# Patient Record
Sex: MALE | Race: WHITE | NOT HISPANIC OR LATINO | URBAN - METROPOLITAN AREA
[De-identification: names, ages, dates, MRNs, and addresses within clinical notes are randomized per-mention and may not be internally consistent; named-entity substitution may affect disease eponyms.]

---

## 2018-07-11 ENCOUNTER — OUTPATIENT (OUTPATIENT)
Dept: OUTPATIENT SERVICES | Facility: HOSPITAL | Age: 83
LOS: 1 days | Discharge: HOME | End: 2018-07-11
Payer: MEDICARE

## 2018-07-11 ENCOUNTER — EMERGENCY (EMERGENCY)
Facility: HOSPITAL | Age: 83
LOS: 0 days | Discharge: HOME | End: 2018-07-11
Attending: EMERGENCY MEDICINE
Payer: MEDICARE

## 2018-07-11 VITALS
RESPIRATION RATE: 18 BRPM | DIASTOLIC BLOOD PRESSURE: 91 MMHG | TEMPERATURE: 98 F | HEART RATE: 67 BPM | HEIGHT: 70 IN | SYSTOLIC BLOOD PRESSURE: 162 MMHG | WEIGHT: 184.97 LBS | OXYGEN SATURATION: 97 %

## 2018-07-11 VITALS
HEART RATE: 63 BPM | RESPIRATION RATE: 18 BRPM | SYSTOLIC BLOOD PRESSURE: 171 MMHG | HEIGHT: 70 IN | TEMPERATURE: 98 F | DIASTOLIC BLOOD PRESSURE: 73 MMHG | WEIGHT: 189.38 LBS | OXYGEN SATURATION: 99 %

## 2018-07-11 VITALS
SYSTOLIC BLOOD PRESSURE: 196 MMHG | OXYGEN SATURATION: 99 % | HEART RATE: 63 BPM | RESPIRATION RATE: 18 BRPM | TEMPERATURE: 98 F | DIASTOLIC BLOOD PRESSURE: 88 MMHG | HEIGHT: 70 IN | WEIGHT: 189.38 LBS

## 2018-07-11 DIAGNOSIS — M16.9 OSTEOARTHRITIS OF HIP, UNSPECIFIED: ICD-10-CM

## 2018-07-11 DIAGNOSIS — R03.0 ELEVATED BLOOD-PRESSURE READING, WITHOUT DIAGNOSIS OF HYPERTENSION: ICD-10-CM

## 2018-07-11 DIAGNOSIS — Z01.818 ENCOUNTER FOR OTHER PREPROCEDURAL EXAMINATION: ICD-10-CM

## 2018-07-11 DIAGNOSIS — I10 ESSENTIAL (PRIMARY) HYPERTENSION: ICD-10-CM

## 2018-07-11 DIAGNOSIS — Z90.89 ACQUIRED ABSENCE OF OTHER ORGANS: Chronic | ICD-10-CM

## 2018-07-11 DIAGNOSIS — Z90.49 ACQUIRED ABSENCE OF OTHER SPECIFIED PARTS OF DIGESTIVE TRACT: Chronic | ICD-10-CM

## 2018-07-11 DIAGNOSIS — M17.12 UNILATERAL PRIMARY OSTEOARTHRITIS, LEFT KNEE: ICD-10-CM

## 2018-07-11 DIAGNOSIS — H91.90 UNSPECIFIED HEARING LOSS, UNSPECIFIED EAR: ICD-10-CM

## 2018-07-11 LAB
ANION GAP SERPL CALC-SCNC: 5 MMOL/L — SIGNIFICANT CHANGE UP (ref 5–17)
APTT BLD: 36.1 SEC — SIGNIFICANT CHANGE UP (ref 27.5–37.4)
BASOPHILS # BLD AUTO: 0.01 K/UL — SIGNIFICANT CHANGE UP (ref 0–0.2)
BASOPHILS NFR BLD AUTO: 0.2 % — SIGNIFICANT CHANGE UP (ref 0–2)
BUN SERPL-MCNC: 27 MG/DL — HIGH (ref 7–23)
CALCIUM SERPL-MCNC: 9 MG/DL — SIGNIFICANT CHANGE UP (ref 8.5–10.1)
CHLORIDE SERPL-SCNC: 107 MMOL/L — SIGNIFICANT CHANGE UP (ref 96–108)
CO2 SERPL-SCNC: 29 MMOL/L — SIGNIFICANT CHANGE UP (ref 22–31)
CREAT SERPL-MCNC: 0.86 MG/DL — SIGNIFICANT CHANGE UP (ref 0.5–1.3)
EOSINOPHIL # BLD AUTO: 0.15 K/UL — SIGNIFICANT CHANGE UP (ref 0–0.5)
EOSINOPHIL NFR BLD AUTO: 2.7 % — SIGNIFICANT CHANGE UP (ref 0–6)
GLUCOSE SERPL-MCNC: 95 MG/DL — SIGNIFICANT CHANGE UP (ref 70–99)
HBA1C BLD-MCNC: 5.4 % — SIGNIFICANT CHANGE UP (ref 4–5.6)
HCT VFR BLD CALC: 47.9 % — SIGNIFICANT CHANGE UP (ref 39–50)
HGB BLD-MCNC: 15.8 G/DL — SIGNIFICANT CHANGE UP (ref 13–17)
IMM GRANULOCYTES NFR BLD AUTO: 0.2 % — SIGNIFICANT CHANGE UP (ref 0–1.5)
INR BLD: 1.01 RATIO — SIGNIFICANT CHANGE UP (ref 0.88–1.16)
LYMPHOCYTES # BLD AUTO: 1.12 K/UL — SIGNIFICANT CHANGE UP (ref 1–3.3)
LYMPHOCYTES # BLD AUTO: 20.5 % — SIGNIFICANT CHANGE UP (ref 13–44)
MCHC RBC-ENTMCNC: 29.5 PG — SIGNIFICANT CHANGE UP (ref 27–34)
MCHC RBC-ENTMCNC: 33 GM/DL — SIGNIFICANT CHANGE UP (ref 32–36)
MCV RBC AUTO: 89.4 FL — SIGNIFICANT CHANGE UP (ref 80–100)
MONOCYTES # BLD AUTO: 0.37 K/UL — SIGNIFICANT CHANGE UP (ref 0–0.9)
MONOCYTES NFR BLD AUTO: 6.8 % — SIGNIFICANT CHANGE UP (ref 2–14)
MRSA PCR RESULT.: SIGNIFICANT CHANGE UP
NEUTROPHILS # BLD AUTO: 3.81 K/UL — SIGNIFICANT CHANGE UP (ref 1.8–7.4)
NEUTROPHILS NFR BLD AUTO: 69.6 % — SIGNIFICANT CHANGE UP (ref 43–77)
PLATELET # BLD AUTO: 176 K/UL — SIGNIFICANT CHANGE UP (ref 150–400)
POTASSIUM SERPL-MCNC: 5 MMOL/L — SIGNIFICANT CHANGE UP (ref 3.5–5.3)
POTASSIUM SERPL-SCNC: 5 MMOL/L — SIGNIFICANT CHANGE UP (ref 3.5–5.3)
PROTHROM AB SERPL-ACNC: 11 SEC — SIGNIFICANT CHANGE UP (ref 9.8–12.7)
RBC # BLD: 5.36 M/UL — SIGNIFICANT CHANGE UP (ref 4.2–5.8)
RBC # FLD: 12.8 % — SIGNIFICANT CHANGE UP (ref 10.3–14.5)
S AUREUS DNA NOSE QL NAA+PROBE: DETECTED
SODIUM SERPL-SCNC: 141 MMOL/L — SIGNIFICANT CHANGE UP (ref 135–145)
WBC # BLD: 5.47 K/UL — SIGNIFICANT CHANGE UP (ref 3.8–10.5)
WBC # FLD AUTO: 5.47 K/UL — SIGNIFICANT CHANGE UP (ref 3.8–10.5)

## 2018-07-11 PROCEDURE — 99282 EMERGENCY DEPT VISIT SF MDM: CPT

## 2018-07-11 PROCEDURE — 93010 ELECTROCARDIOGRAM REPORT: CPT | Mod: NC

## 2018-07-11 NOTE — H&P PST ADULT - PROBLEM SELECTOR PLAN 1
Left Hip Arthroplasty scheduled for 7/26/2018.  Pre-op instructions given by RN  Chlorhexidine wash instructions given  Pending: Medical clearance

## 2018-07-11 NOTE — ED PROVIDER NOTE - MEDICAL DECISION MAKING DETAILS
82 yo M with hypertension, no complaints currently  -f/u with pcp, start bp medications as needed outpt.  -pt. understands to return if

## 2018-07-11 NOTE — PHYSICAL THERAPY INITIAL EVALUATION ADULT - PERTINENT HX OF CURRENT PROBLEM, REHAB EVAL
Patient attends Pre-Op Testing today following consult with Dr. Yu  due to chronic pain to left hip  with findings of     on arthritic damage. No significant past medical/surgical histories. Elective THR is now scheduled in this facility for 7/26/18. Patient attends Pre-Op Testing today following consult with Dr. Yu  due to chronic pain to left hip  with findings of arthritic damage. No significant past medical/surgical histories. Elective THR is now scheduled in this facility for 7/26/18.

## 2018-07-11 NOTE — H&P PST ADULT - NEGATIVE MUSCULOSKELETAL SYMPTOMS
no arthralgia/no joint swelling/no myalgia/no stiffness/no neck pain/no muscle cramps/no muscle weakness no arthralgia/no neck pain/no back pain/no leg pain R/no joint swelling/no stiffness/no arm pain R/no muscle weakness/no myalgia/no muscle cramps/no arm pain L

## 2018-07-11 NOTE — PHYSICAL THERAPY INITIAL EVALUATION ADULT - CRITERIA FOR SKILLED THERAPEUTIC INTERVENTIONS
impairments found/anticipated equipment needs at discharge/functional limitations in following categories/:CI,:CH,:CI/rehab potential/therapy frequency/anticipated discharge recommendation/risk reduction/prevention

## 2018-07-11 NOTE — PHYSICAL THERAPY INITIAL EVALUATION ADULT - ADDITIONAL COMMENTS
Per patient, pain is worse when increased walking and standing. Pain is relieved sitting, resting, and taking pain medication. Patient lives with wife in private house with 3 steps to enter with pole to hold on. Has 13 steps bedroom with handrails. Half-bath at main level; has no rooms there to stay at temporarily. Bathroom facility at 2nd floor is a shower stall without adaptive devices. Patient will be supported by wife post-op at home. Patient uses a quadcane at baseline, and owns a 3:1 commode from wife's knee surgery; is independent for ADLs and gait. Patient is retired; is  left-handed and drives. Patient denies falls in past 6 months.

## 2018-07-11 NOTE — PATIENT PROFILE ADULT. - ABILITY TO HEAR (WITH HEARING AID OR HEARING APPLIANCE IF NORMALLY USED):
Mildly to Moderately Impaired: difficulty hearing in some environments or speaker may need to increase volume or speak distinctly/[place]

## 2018-07-11 NOTE — PHYSICAL THERAPY INITIAL EVALUATION ADULT - MODIFIED CLINICAL TEST OF SENSORY INTEGRATION IN BALANCE TEST
5x Sit to Stand Test = 12 seconds, indicating significant impairment c functional mobility & strength  ; 2 Minute Walk Test = 328 feet with quadcane, no rest stops, measured for baseline recording

## 2018-07-11 NOTE — PHYSICAL THERAPY INITIAL EVALUATION ADULT - IMPAIRED TRANSFERS: SIT/STAND, REHAB EVAL
decreased ROM/impaired balance/decreased strength/narrow base of support/pain/impaired postural control

## 2018-07-11 NOTE — OCCUPATIONAL THERAPY INITIAL EVALUATION ADULT - FINE MOTOR COORDINATION, FINE MOTOR COORDINATION TESTS, OT EVAL
Patient-specific activity scoring scheme (Point to one number): 0 -------5-------- 10 (0) =Unable to perform activity (10) -- Able to perform activity at the same level as before injury or problem. Activity: Standing__5____, Activity: stairs____7_______

## 2018-07-11 NOTE — H&P PST ADULT - HISTORY OF PRESENT ILLNESS
84yo male denies significant medical history, reports pain to left hip x 1 year, with progressive worsening of pain and difficulty walking. Pt presents today for Left Hip Arthroplasty scheduled for 7/26/2018.

## 2018-07-11 NOTE — ED ADULT TRIAGE NOTE - CHIEF COMPLAINT QUOTE
patient was send here from presurgical testing for evaluation high BP , patient denied chest pain , no headache denied N/V denied dizziness

## 2018-07-11 NOTE — H&P PST ADULT - PROBLEM SELECTOR PLAN 2
Medical clearance requested Medical clearance requested  Pt sent to ER@ 145pm accompanied by wife, pt's PCP not in office today  Pt asymptomatic /98, 204/84, 196/88

## 2018-07-11 NOTE — H&P PST ADULT - MUSCULOSKELETAL
details… detailed exam no joint erythema/no joint swelling/no calf tenderness/decreased ROM due to pain/diminished strength/no joint warmth

## 2018-07-11 NOTE — OCCUPATIONAL THERAPY INITIAL EVALUATION ADULT - ADDITIONAL COMMENTS
Patient lives in a private home with 3 steps to enter wit a pole to hold on to. Once inside, the patient has 13 steps with a railing on one side to reach main floor where bedroom and bathroom is. The patients bathroom has a shower stall with a regular toilet and owns a commode. The patient ambulates with a quad cane and does not own any other devices for ambulation. The patient is L handed, wears glasses for reading and drives.

## 2018-07-11 NOTE — PHYSICAL THERAPY INITIAL EVALUATION ADULT - IMPAIRMENTS FOUND, PT EVAL
neuromotor development and sensory integration/gait, locomotion, and balance/aerobic capacity/endurance/arousal, attention, and cognition/ROM/ergonomics and body mechanics/joint integrity and mobility/muscle strength/posture

## 2018-07-11 NOTE — ED PROVIDER NOTE - OBJECTIVE STATEMENT
84 yo M with htn, sent from presurgical testing.  Pt. has no complaints.  His pressure was 200/100 at the time.  Pt. admits he was stressed about the testing and getting his hip replacement.  No complaints currently.  Pressure is significantly better now at triage.   ROS: negative for fever, cough, headache, chest pain, shortness of breath, abd pain, nausea, vomiting, diarrhea, rash, paresthesia, and weakness--all other systems reviewed are negative.   PMH: negative; Meds: Denies; SH: Denies smoking/drinking/drug use

## 2018-07-15 ENCOUNTER — EMERGENCY (EMERGENCY)
Facility: HOSPITAL | Age: 83
LOS: 0 days | Discharge: HOME | End: 2018-07-15
Attending: EMERGENCY MEDICINE
Payer: MEDICARE

## 2018-07-15 VITALS
WEIGHT: 184.97 LBS | DIASTOLIC BLOOD PRESSURE: 85 MMHG | OXYGEN SATURATION: 99 % | HEIGHT: 70 IN | SYSTOLIC BLOOD PRESSURE: 175 MMHG | TEMPERATURE: 98 F | HEART RATE: 66 BPM | RESPIRATION RATE: 18 BRPM

## 2018-07-15 VITALS
TEMPERATURE: 98 F | HEART RATE: 59 BPM | SYSTOLIC BLOOD PRESSURE: 144 MMHG | OXYGEN SATURATION: 98 % | RESPIRATION RATE: 18 BRPM | DIASTOLIC BLOOD PRESSURE: 75 MMHG

## 2018-07-15 DIAGNOSIS — Z79.1 LONG TERM (CURRENT) USE OF NON-STEROIDAL ANTI-INFLAMMATORIES (NSAID): ICD-10-CM

## 2018-07-15 DIAGNOSIS — M17.0 BILATERAL PRIMARY OSTEOARTHRITIS OF KNEE: ICD-10-CM

## 2018-07-15 DIAGNOSIS — M25.461 EFFUSION, RIGHT KNEE: ICD-10-CM

## 2018-07-15 DIAGNOSIS — Z90.49 ACQUIRED ABSENCE OF OTHER SPECIFIED PARTS OF DIGESTIVE TRACT: Chronic | ICD-10-CM

## 2018-07-15 DIAGNOSIS — M16.12 UNILATERAL PRIMARY OSTEOARTHRITIS, LEFT HIP: ICD-10-CM

## 2018-07-15 DIAGNOSIS — Z90.89 ACQUIRED ABSENCE OF OTHER ORGANS: Chronic | ICD-10-CM

## 2018-07-15 DIAGNOSIS — M25.561 PAIN IN RIGHT KNEE: ICD-10-CM

## 2018-07-15 PROCEDURE — 73564 X-RAY EXAM KNEE 4 OR MORE: CPT | Mod: 26,RT

## 2018-07-15 PROCEDURE — 99284 EMERGENCY DEPT VISIT MOD MDM: CPT

## 2018-07-15 RX ORDER — KETOROLAC TROMETHAMINE 30 MG/ML
15 SYRINGE (ML) INJECTION ONCE
Qty: 0 | Refills: 0 | Status: DISCONTINUED | OUTPATIENT
Start: 2018-07-15 | End: 2018-07-15

## 2018-07-15 RX ADMIN — Medication 15 MILLIGRAM(S): at 11:07

## 2018-07-15 NOTE — ED PROVIDER NOTE - OBJECTIVE STATEMENT
83 year old male with PMH of hearing impairment, PSH of tosillectomy, appendectomy presenting to ED due to R knee pain - pt has hx of left knee and hip arthritis - now scheduled for left hip surgery in 2 weeks - pt states for past 4 days noted R knee mildly swollen with pain to joint. Denies any injury or trauma.

## 2018-07-15 NOTE — ED ADULT NURSE NOTE - NS ED NURSE LEVEL OF CONSCIOUSNESS MENTAL STATUS
Problem: Patient Care Overview  Goal: Discharge Needs Assessment  Outcome: Ongoing (interventions implemented as appropriate)         Alert/Cooperative/Awake

## 2018-07-15 NOTE — ED ADULT TRIAGE NOTE - CHIEF COMPLAINT QUOTE
Reports having right knee pain starting a 4 days ago, worsens with movement, and lessens with not bearing weight, and resting. Denies numbness, tingling.

## 2018-07-15 NOTE — ED PROVIDER NOTE - MUSCULOSKELETAL MINIMAL EXAM
R knee mild swelling noted, ROM of joint intact, no erythema of overlying skin. warmth of joint vs left

## 2018-07-15 NOTE — ED ADULT NURSE NOTE - OBJECTIVE STATEMENT
Reports having Right knee pain starting 4 days ago, worsens with movement and bearing weight, lessens with rest. Denies numbness tingling, injury, trauma.

## 2018-07-15 NOTE — ED PROVIDER NOTE - MEDICAL DECISION MAKING DETAILS
R knee with xray noted arthritic changes, otherwise ROM intact, will dc with orthopedic follow up with Dr. Yu

## 2018-07-25 ENCOUNTER — TRANSCRIPTION ENCOUNTER (OUTPATIENT)
Age: 83
End: 2018-07-25

## 2018-07-25 RX ORDER — SODIUM CHLORIDE 9 MG/ML
3 INJECTION INTRAMUSCULAR; INTRAVENOUS; SUBCUTANEOUS EVERY 8 HOURS
Qty: 0 | Refills: 0 | Status: DISCONTINUED | OUTPATIENT
Start: 2018-07-26 | End: 2018-07-27

## 2018-07-26 ENCOUNTER — TRANSCRIPTION ENCOUNTER (OUTPATIENT)
Age: 83
End: 2018-07-26

## 2018-07-26 ENCOUNTER — INPATIENT (INPATIENT)
Facility: HOSPITAL | Age: 83
LOS: 0 days | Discharge: HOME HEALTH | End: 2018-07-27
Attending: ORTHOPAEDIC SURGERY | Admitting: ORTHOPAEDIC SURGERY
Payer: MEDICARE

## 2018-07-26 ENCOUNTER — RESULT REVIEW (OUTPATIENT)
Age: 83
End: 2018-07-26

## 2018-07-26 VITALS
TEMPERATURE: 98 F | DIASTOLIC BLOOD PRESSURE: 75 MMHG | WEIGHT: 187.39 LBS | RESPIRATION RATE: 18 BRPM | HEART RATE: 70 BPM | OXYGEN SATURATION: 98 % | SYSTOLIC BLOOD PRESSURE: 173 MMHG | HEIGHT: 70 IN

## 2018-07-26 DIAGNOSIS — Z90.89 ACQUIRED ABSENCE OF OTHER ORGANS: Chronic | ICD-10-CM

## 2018-07-26 DIAGNOSIS — H91.90 UNSPECIFIED HEARING LOSS, UNSPECIFIED EAR: ICD-10-CM

## 2018-07-26 DIAGNOSIS — M16.12 UNILATERAL PRIMARY OSTEOARTHRITIS, LEFT HIP: ICD-10-CM

## 2018-07-26 DIAGNOSIS — Z90.49 ACQUIRED ABSENCE OF OTHER SPECIFIED PARTS OF DIGESTIVE TRACT: Chronic | ICD-10-CM

## 2018-07-26 LAB
ANION GAP SERPL CALC-SCNC: 9 MMOL/L — SIGNIFICANT CHANGE UP (ref 5–17)
BUN SERPL-MCNC: 20 MG/DL — SIGNIFICANT CHANGE UP (ref 7–23)
CALCIUM SERPL-MCNC: 8.5 MG/DL — SIGNIFICANT CHANGE UP (ref 8.5–10.1)
CHLORIDE SERPL-SCNC: 106 MMOL/L — SIGNIFICANT CHANGE UP (ref 96–108)
CO2 SERPL-SCNC: 26 MMOL/L — SIGNIFICANT CHANGE UP (ref 22–31)
CREAT SERPL-MCNC: 0.89 MG/DL — SIGNIFICANT CHANGE UP (ref 0.5–1.3)
GLUCOSE SERPL-MCNC: 112 MG/DL — HIGH (ref 70–99)
HCT VFR BLD CALC: 43.8 % — SIGNIFICANT CHANGE UP (ref 39–50)
HGB BLD-MCNC: 14.3 G/DL — SIGNIFICANT CHANGE UP (ref 13–17)
MCHC RBC-ENTMCNC: 29.2 PG — SIGNIFICANT CHANGE UP (ref 27–34)
MCHC RBC-ENTMCNC: 32.6 GM/DL — SIGNIFICANT CHANGE UP (ref 32–36)
MCV RBC AUTO: 89.4 FL — SIGNIFICANT CHANGE UP (ref 80–100)
NRBC # BLD: 0 /100 WBCS — SIGNIFICANT CHANGE UP (ref 0–0)
PLATELET # BLD AUTO: 221 K/UL — SIGNIFICANT CHANGE UP (ref 150–400)
POTASSIUM SERPL-MCNC: 3.8 MMOL/L — SIGNIFICANT CHANGE UP (ref 3.5–5.3)
POTASSIUM SERPL-SCNC: 3.8 MMOL/L — SIGNIFICANT CHANGE UP (ref 3.5–5.3)
RBC # BLD: 4.9 M/UL — SIGNIFICANT CHANGE UP (ref 4.2–5.8)
RBC # FLD: 13.1 % — SIGNIFICANT CHANGE UP (ref 10.3–14.5)
SODIUM SERPL-SCNC: 141 MMOL/L — SIGNIFICANT CHANGE UP (ref 135–145)
WBC # BLD: 14.02 K/UL — HIGH (ref 3.8–10.5)
WBC # FLD AUTO: 14.02 K/UL — HIGH (ref 3.8–10.5)

## 2018-07-26 PROCEDURE — 88305 TISSUE EXAM BY PATHOLOGIST: CPT | Mod: 26

## 2018-07-26 PROCEDURE — 99222 1ST HOSP IP/OBS MODERATE 55: CPT

## 2018-07-26 PROCEDURE — 88311 DECALCIFY TISSUE: CPT | Mod: 26

## 2018-07-26 RX ORDER — ASCORBIC ACID 60 MG
500 TABLET,CHEWABLE ORAL
Qty: 0 | Refills: 0 | Status: DISCONTINUED | OUTPATIENT
Start: 2018-07-26 | End: 2018-07-27

## 2018-07-26 RX ORDER — ASPIRIN/CALCIUM CARB/MAGNESIUM 324 MG
325 TABLET ORAL
Qty: 0 | Refills: 0 | Status: DISCONTINUED | OUTPATIENT
Start: 2018-07-27 | End: 2018-07-27

## 2018-07-26 RX ORDER — DEXAMETHASONE 0.5 MG/5ML
10 ELIXIR ORAL ONCE
Qty: 0 | Refills: 0 | Status: COMPLETED | OUTPATIENT
Start: 2018-07-27 | End: 2018-07-27

## 2018-07-26 RX ORDER — PANTOPRAZOLE SODIUM 20 MG/1
40 TABLET, DELAYED RELEASE ORAL DAILY
Qty: 0 | Refills: 0 | Status: DISCONTINUED | OUTPATIENT
Start: 2018-07-26 | End: 2018-07-27

## 2018-07-26 RX ORDER — OXYCODONE HYDROCHLORIDE 5 MG/1
5 TABLET ORAL EVERY 4 HOURS
Qty: 0 | Refills: 0 | Status: COMPLETED | OUTPATIENT
Start: 2018-07-26 | End: 2018-07-27

## 2018-07-26 RX ORDER — HYDROMORPHONE HYDROCHLORIDE 2 MG/ML
1 INJECTION INTRAMUSCULAR; INTRAVENOUS; SUBCUTANEOUS EVERY 4 HOURS
Qty: 0 | Refills: 0 | Status: DISCONTINUED | OUTPATIENT
Start: 2018-07-26 | End: 2018-07-27

## 2018-07-26 RX ORDER — ACETAMINOPHEN 500 MG
650 TABLET ORAL EVERY 6 HOURS
Qty: 0 | Refills: 0 | Status: DISCONTINUED | OUTPATIENT
Start: 2018-07-26 | End: 2018-07-26

## 2018-07-26 RX ORDER — DOCUSATE SODIUM 100 MG
100 CAPSULE ORAL THREE TIMES A DAY
Qty: 0 | Refills: 0 | Status: DISCONTINUED | OUTPATIENT
Start: 2018-07-26 | End: 2018-07-27

## 2018-07-26 RX ORDER — ACETAMINOPHEN 500 MG
1000 TABLET ORAL ONCE
Qty: 0 | Refills: 0 | Status: COMPLETED | OUTPATIENT
Start: 2018-07-26 | End: 2018-07-26

## 2018-07-26 RX ORDER — ONDANSETRON 8 MG/1
4 TABLET, FILM COATED ORAL EVERY 6 HOURS
Qty: 0 | Refills: 0 | Status: DISCONTINUED | OUTPATIENT
Start: 2018-07-26 | End: 2018-07-27

## 2018-07-26 RX ORDER — HYDROMORPHONE HYDROCHLORIDE 2 MG/ML
0.5 INJECTION INTRAMUSCULAR; INTRAVENOUS; SUBCUTANEOUS
Qty: 0 | Refills: 0 | Status: DISCONTINUED | OUTPATIENT
Start: 2018-07-26 | End: 2018-07-26

## 2018-07-26 RX ORDER — ZALEPLON 10 MG
5 CAPSULE ORAL AT BEDTIME
Qty: 0 | Refills: 0 | Status: DISCONTINUED | OUTPATIENT
Start: 2018-07-26 | End: 2018-07-27

## 2018-07-26 RX ORDER — POLYETHYLENE GLYCOL 3350 17 G/17G
17 POWDER, FOR SOLUTION ORAL DAILY
Qty: 0 | Refills: 0 | Status: DISCONTINUED | OUTPATIENT
Start: 2018-07-26 | End: 2018-07-27

## 2018-07-26 RX ORDER — ACETAMINOPHEN 500 MG
975 TABLET ORAL EVERY 8 HOURS
Qty: 0 | Refills: 0 | Status: DISCONTINUED | OUTPATIENT
Start: 2018-07-26 | End: 2018-07-27

## 2018-07-26 RX ORDER — OXYCODONE HYDROCHLORIDE 5 MG/1
10 TABLET ORAL EVERY 4 HOURS
Qty: 0 | Refills: 0 | Status: DISCONTINUED | OUTPATIENT
Start: 2018-07-26 | End: 2018-07-27

## 2018-07-26 RX ORDER — SODIUM CHLORIDE 9 MG/ML
1000 INJECTION, SOLUTION INTRAVENOUS
Qty: 0 | Refills: 0 | Status: DISCONTINUED | OUTPATIENT
Start: 2018-07-26 | End: 2018-07-26

## 2018-07-26 RX ORDER — ACETAMINOPHEN 500 MG
650 TABLET ORAL ONCE
Qty: 0 | Refills: 0 | Status: COMPLETED | OUTPATIENT
Start: 2018-07-26 | End: 2018-07-26

## 2018-07-26 RX ORDER — MUPIROCIN 20 MG/G
1 OINTMENT TOPICAL
Qty: 0 | Refills: 0 | Status: DISCONTINUED | OUTPATIENT
Start: 2018-07-26 | End: 2018-07-27

## 2018-07-26 RX ORDER — CELECOXIB 200 MG/1
200 CAPSULE ORAL
Qty: 0 | Refills: 0 | Status: DISCONTINUED | OUTPATIENT
Start: 2018-07-26 | End: 2018-07-27

## 2018-07-26 RX ORDER — TRANEXAMIC ACID 100 MG/ML
1000 INJECTION, SOLUTION INTRAVENOUS ONCE
Qty: 0 | Refills: 0 | Status: COMPLETED | OUTPATIENT
Start: 2018-07-26 | End: 2018-07-26

## 2018-07-26 RX ORDER — OXYCODONE HYDROCHLORIDE 5 MG/1
5 TABLET ORAL EVERY 4 HOURS
Qty: 0 | Refills: 0 | Status: DISCONTINUED | OUTPATIENT
Start: 2018-07-26 | End: 2018-07-27

## 2018-07-26 RX ORDER — CEFAZOLIN SODIUM 1 G
2000 VIAL (EA) INJECTION EVERY 8 HOURS
Qty: 0 | Refills: 0 | Status: COMPLETED | OUTPATIENT
Start: 2018-07-26 | End: 2018-07-27

## 2018-07-26 RX ORDER — FOLIC ACID 0.8 MG
1 TABLET ORAL DAILY
Qty: 0 | Refills: 0 | Status: DISCONTINUED | OUTPATIENT
Start: 2018-07-26 | End: 2018-07-27

## 2018-07-26 RX ORDER — DIPHENHYDRAMINE HCL 50 MG
25 CAPSULE ORAL AT BEDTIME
Qty: 0 | Refills: 0 | Status: DISCONTINUED | OUTPATIENT
Start: 2018-07-26 | End: 2018-07-27

## 2018-07-26 RX ORDER — FERROUS SULFATE 325(65) MG
325 TABLET ORAL
Qty: 0 | Refills: 0 | Status: DISCONTINUED | OUTPATIENT
Start: 2018-07-26 | End: 2018-07-27

## 2018-07-26 RX ORDER — CELECOXIB 200 MG/1
200 CAPSULE ORAL ONCE
Qty: 0 | Refills: 0 | Status: COMPLETED | OUTPATIENT
Start: 2018-07-26 | End: 2018-07-26

## 2018-07-26 RX ORDER — SENNA PLUS 8.6 MG/1
2 TABLET ORAL AT BEDTIME
Qty: 0 | Refills: 0 | Status: DISCONTINUED | OUTPATIENT
Start: 2018-07-26 | End: 2018-07-27

## 2018-07-26 RX ORDER — SODIUM CHLORIDE 9 MG/ML
1000 INJECTION, SOLUTION INTRAVENOUS
Qty: 0 | Refills: 0 | Status: DISCONTINUED | OUTPATIENT
Start: 2018-07-26 | End: 2018-07-27

## 2018-07-26 RX ADMIN — Medication 400 MILLIGRAM(S): at 19:08

## 2018-07-26 RX ADMIN — Medication 5 MILLIGRAM(S): at 23:33

## 2018-07-26 RX ADMIN — SODIUM CHLORIDE 3 MILLILITER(S): 9 INJECTION INTRAMUSCULAR; INTRAVENOUS; SUBCUTANEOUS at 21:57

## 2018-07-26 RX ADMIN — OXYCODONE HYDROCHLORIDE 5 MILLIGRAM(S): 5 TABLET ORAL at 21:57

## 2018-07-26 RX ADMIN — SODIUM CHLORIDE 75 MILLILITER(S): 9 INJECTION, SOLUTION INTRAVENOUS at 17:40

## 2018-07-26 RX ADMIN — CELECOXIB 200 MILLIGRAM(S): 200 CAPSULE ORAL at 14:25

## 2018-07-26 RX ADMIN — Medication 975 MILLIGRAM(S): at 21:57

## 2018-07-26 RX ADMIN — Medication 650 MILLIGRAM(S): at 14:25

## 2018-07-26 RX ADMIN — Medication 1000 MILLIGRAM(S): at 19:21

## 2018-07-26 RX ADMIN — SODIUM CHLORIDE 75 MILLILITER(S): 9 INJECTION, SOLUTION INTRAVENOUS at 17:39

## 2018-07-26 RX ADMIN — Medication 100 MILLIGRAM(S): at 22:01

## 2018-07-26 RX ADMIN — SODIUM CHLORIDE 3 MILLILITER(S): 9 INJECTION INTRAMUSCULAR; INTRAVENOUS; SUBCUTANEOUS at 14:02

## 2018-07-26 RX ADMIN — OXYCODONE HYDROCHLORIDE 5 MILLIGRAM(S): 5 TABLET ORAL at 22:57

## 2018-07-26 RX ADMIN — TRANEXAMIC ACID 220 MILLIGRAM(S): 100 INJECTION, SOLUTION INTRAVENOUS at 18:13

## 2018-07-26 NOTE — DISCHARGE NOTE ADULT - CARE PROVIDER_API CALL
Tra Yu), Orthopaedic Surgery  [address] [place] [address]  Phone: [phone]  Fax: [phone]

## 2018-07-26 NOTE — DISCHARGE NOTE ADULT - ABILITY TO HEAR (WITH HEARING AID OR HEARING APPLIANCE IF NORMALLY USED):
[place] right ear/Mildly to Moderately Impaired: difficulty hearing in some environments or speaker may need to increase volume or speak distinctly

## 2018-07-26 NOTE — DISCHARGE NOTE ADULT - MEDICATION SUMMARY - MEDICATIONS TO CHANGE
I will SWITCH the dose or number of times a day I take the medications listed below when I get home from the hospital:    Vitamin C  -- 1 tab(s) by mouth 3 to 4 times a week

## 2018-07-26 NOTE — ASU PREOP CHECKLIST - TYPE OF SOLUTION
[facility] Testing Department  Phone: [phone]  OUTSIDE TESTING RESULT REQUEST      TO:   Dr. Rhona Swan Date: 4/21/21    FAX #: [phone]     IMPORTANT: FOR YOUR IMMEDIATE ATTENTION  Please FAX all test results listed below to: [address] saline lock

## 2018-07-26 NOTE — CONSULT NOTE ADULT - PROBLEM SELECTOR RECOMMENDATION 9
cont with pain management and bowel regimen  dvt ppx as per ortho  OT/PT  incentive spirometer  zofran prn for n/v

## 2018-07-26 NOTE — DISCHARGE NOTE ADULT - ADDITIONAL INSTRUCTIONS
Please call your MD, if you have new onset of fevers, increased drainage, increased pain or increased redness around the incision site. Please return to the Emergency Department if you have chest pain or shortness of breath. Follow up with Dr. Yu in 2 weeks. Please call [phone] for appointment.      Please call your MD, if you have new onset of fevers, increased drainage, increased pain or increased redness around the incision site. Please return to the Emergency Department if you have chest pain or shortness of breath.

## 2018-07-26 NOTE — PATIENT PROFILE ADULT. - ABILITY TO HEAR (WITH HEARING AID OR HEARING APPLIANCE IF NORMALLY USED):
Mildly to Moderately Impaired: difficulty hearing in some environments or speaker may need to increase volume or speak distinctly/[place] right ear

## 2018-07-26 NOTE — DISCHARGE NOTE ADULT - MEDICATION SUMMARY - MEDICATIONS TO STOP TAKING
I will STOP taking the medications listed below when I get home from the hospital:    Advil PM  -- 1 dose(s) by mouth once a day (at bedtime)    Advil 200 mg oral tablet  -- 2 tab(s) by mouth 3 times a day, As Needed    mupirocin 2% topical ointment  -- Apply on skin to affected area 2 times a day   intranasaly  -- For external use only.

## 2018-07-26 NOTE — DISCHARGE NOTE ADULT - NS AS ACTIVITY OBS
Stairs allowed/Showering allowed/Walking-Indoors allowed/No Heavy lifting/straining/Walking-Outdoors allowed/Do not drive or operate machinery

## 2018-07-26 NOTE — DISCHARGE NOTE ADULT - PATIENT PORTAL LINK FT
You can access the [facility], offered by [facility], by registering with the following website: [email or web address]

## 2018-07-26 NOTE — DISCHARGE NOTE ADULT - MEDICATION SUMMARY - MEDICATIONS TO TAKE
I will START or STAY ON the medications listed below when I get home from the hospital:    acetaminophen 325 mg oral tablet  -- 3 tab(s) by mouth every 8 hours as needed for pain   -- Indication: For Pain control    celecoxib 200 mg oral capsule  -- 1 cap(s) by mouth every 12 hours MDD:2 capsules  -- Indication: For Pain control    oxyCODONE 5 mg oral tablet  -- 1 tab(s) by mouth every 4 hours, As needed for pain MDD:6 tablets  -- Indication: For Pain control    aspirin 325 mg oral delayed release tablet  -- 1 tab(s) by mouth every 12 hours to prevent blood clots MDD:2 tablets  -- Indication: For to prevent blood clots     bisacodyl 10 mg rectal suppository  -- 1 suppository(ies) rectally once a day, As needed, If no bowel movement by POD#2  -- Indication: For to treat constipation    docusate sodium 100 mg oral capsule  -- 1 cap(s) by mouth 3 times a day  -- Indication: For to prevent constipation    polyethylene glycol 3350 oral powder for reconstitution  -- 17 gram(s) by mouth once a day  -- Indication: For to prevent constipation    pantoprazole 40 mg oral delayed release tablet  -- 1 tab(s) by mouth once a day MDD:1 tablet  -- Indication: For to protect the lining of your stomach    Multiple Vitamins oral tablet  -- 1 tab(s) by mouth once a day  -- Indication: For for wound healing    ascorbic acid 500 mg oral tablet  -- 1 tab(s) by mouth 2 times a day  -- Indication: For for wound healing    folic acid 1 mg oral tablet  -- 1 tab(s) by mouth once a day  -- Indication: For for wound healing

## 2018-07-26 NOTE — PROGRESS NOTE ADULT - SUBJECTIVE AND OBJECTIVE BOX
Post-op Check   POD#0 s/p Left STEPHANIE   83yMale Patient seen and examined, Pain controlled  Patient Denies SOB, CP, N/V/D       PE: Left Hip/LE: Dressing C/D/I, DP 2+, Sensation/motor o/5 Secondary to Spinal             A: As above   P: Pain Control       DVT Prophylaxis      Incentive spirometry      Total hip precautions Reviewed       PT WBAT LLE      Isometric exercises      Discharge Planning      All the above discussed and understood by pt       Ortho to F/U

## 2018-07-26 NOTE — DISCHARGE NOTE ADULT - HOSPITAL COURSE
83yMale with history of Left Hip Pain presenting for Left STEPHANIE by Dr. Yu on 7/26/18. Risk and benefits of surgery were explained to the patient. The patient understood and agreed to proceed with surgery. Patient underwent the procedure with no intraoperative complications. Pt was brought in stable condition to the PACU. Once stable in PACU, pt was brought to the floor. During hospital stay pt was followed by Medicine, Pt had an uneventful hospital course. Pt is stable for discharge to Home with Home Care Services and PT

## 2018-07-26 NOTE — PATIENT PROFILE ADULT. - VISION (WITH CORRECTIVE LENSES IF THE PATIENT USUALLY WEARS THEM):
Partially impaired: cannot see medication labels or newsprint, but can see obstacles in path, and the surrounding layout; can count fingers at arm's length/Use Glasses Severely impaired: cannot locate objects without hearing or touching them or patient nonresponsive./Use Glasses

## 2018-07-26 NOTE — BRIEF OPERATIVE NOTE - PROCEDURE
<<-----Click on this checkbox to enter Procedure Total hip arthroplasty  07/26/2018    Active  [id number]

## 2018-07-26 NOTE — CONSULT NOTE ADULT - SUBJECTIVE AND OBJECTIVE BOX
HPI:  82 yo M [place], OA, s/p left STEPHANIE. Pt. seen in PACU, pain in control, no n/v, no cp, no sob    PAST MEDICAL & SURGICAL HISTORY:  Hearing impairment  History of tonsillectomy  History of appendectomy      REVIEW OF SYSTEMS:    CONSTITUTIONAL: No fever, weight loss, + fatigue  EYES: No eye pain, visual disturbances, or discharge  ENMT:  No difficulty hearing, tinnitus, vertigo; No sinus or throat pain  RESPIRATORY: No cough, wheezing, chills or hemoptysis; No shortness of breath  CARDIOVASCULAR: No chest pain, palpitations, dizziness, or leg swelling  GASTROINTESTINAL: No abdominal or epigastric pain. No nausea, vomiting, or hematemesis; No diarrhea or constipation. No melena or hematochezia.  GENITOURINARY: No dysuria, frequency, hematuria, or incontinence  NEUROLOGICAL: No headaches, memory loss, loss of strength, numbness, or tremors  MUSCULOSKELETAL: No joint pain or swelling; No muscle, back, or extremity pain        MEDICATIONS  (STANDING):  acetaminophen   Tablet 975 milliGRAM(s) Oral every 8 hours  ascorbic acid 500 milliGRAM(s) Oral two times a day  ceFAZolin   IVPB 2000 milliGRAM(s) IV Intermittent every 8 hours  celecoxib 200 milliGRAM(s) Oral two times a day  docusate sodium 100 milliGRAM(s) Oral three times a day  ferrous    sulfate 325 milliGRAM(s) Oral three times a day with meals  folic acid 1 milliGRAM(s) Oral daily  lactated ringers. 1000 milliLiter(s) (120 mL/Hr) IV Continuous <Continuous>  multivitamin 1 Tablet(s) Oral daily  mupirocin 2% Ointment 1 Application(s) Topical two times a day  oxyCODONE    IR 5 milliGRAM(s) Oral every 4 hours  pantoprazole    Tablet 40 milliGRAM(s) Oral daily  polyethylene glycol 3350 17 Gram(s) Oral daily  sodium chloride 0.9% lock flush 3 milliLiter(s) IV Push every 8 hours    MEDICATIONS  (PRN):  aluminum hydroxide/magnesium hydroxide/simethicone Suspension 30 milliLiter(s) Oral four times a day PRN Indigestion  diphenhydrAMINE   Capsule 25 milliGRAM(s) Oral at bedtime PRN Insomnia  HYDROmorphone  Injectable 1 milliGRAM(s) IV Push every 4 hours PRN breakthrough pain  ondansetron Injectable 4 milliGRAM(s) IV Push every 6 hours PRN Nausea and/or Vomiting  oxyCODONE    IR 5 milliGRAM(s) Oral every 4 hours PRN Mild Pain  oxyCODONE    IR 10 milliGRAM(s) Oral every 4 hours PRN Moderate Pain  senna 2 Tablet(s) Oral at bedtime PRN Constipation  zaleplon 5 milliGRAM(s) Oral at bedtime PRN Insomnia      Allergies    No Known Allergies    Intolerances        SOCIAL HISTORY:  occ. EtOH, no smoking    FAMILY HISTORY:  Family history of prostate cancer in father (Father)      Vital Signs Last 24 Hrs  T(C): 36.2 (26 Jul 2018 20:45), Max: 36.9 (26 Jul 2018 13:43)  T(F): 97.2 (26 Jul 2018 20:45), Max: 98.4 (26 Jul 2018 13:43)  HR: 79 (26 Jul 2018 20:45) (50 - 79)  BP: 174/83 (26 Jul 2018 20:45) (142/72 - 181/88)  BP(mean): --  RR: 17 (26 Jul 2018 20:45) (13 - 19)  SpO2: 98% (26 Jul 2018 20:45) (96% - 100%)    PHYSICAL EXAM:    GENERAL: NAD  HEAD:  Atraumatic, Normocephalic  EYES: EOMI, PERRLA, conjunctiva and sclera clear  ENMT: No tonsillar erythema, exudates, or enlargement; Moist mucous membranes  NECK: Supple, No JVD, Normal thyroid  NERVOUS SYSTEM:  Alert & Oriented X3, Good concentration; Motor Strength 5/5 B/L upper and lower extremities  CHEST/LUNG: Clear to percussion bilaterally; No rales, rhonchi, wheezing, or rubs  HEART: Regular rate and rhythm; No murmurs, rubs, or gallops  ABDOMEN: Soft, Nontender, Nondistended; Bowel sounds present  EXTREMITIES:  2+ Peripheral Pulses, left hip c/d/i        LABS:                        14.3   14.02 )-----------( 221      ( 26 Jul 2018 18:53 )             43.8     07-26    141  |  106  |  20  ----------------------------<  112<H>  3.8   |  26  |  0.89    Ca    8.5      26 Jul 2018 18:53            RADIOLOGY & ADDITIONAL STUDIES:

## 2018-07-27 VITALS
DIASTOLIC BLOOD PRESSURE: 77 MMHG | RESPIRATION RATE: 18 BRPM | OXYGEN SATURATION: 97 % | HEART RATE: 78 BPM | SYSTOLIC BLOOD PRESSURE: 144 MMHG

## 2018-07-27 DIAGNOSIS — I10 ESSENTIAL (PRIMARY) HYPERTENSION: ICD-10-CM

## 2018-07-27 LAB
ANION GAP SERPL CALC-SCNC: 7 MMOL/L — SIGNIFICANT CHANGE UP (ref 5–17)
BUN SERPL-MCNC: 23 MG/DL — SIGNIFICANT CHANGE UP (ref 7–23)
CALCIUM SERPL-MCNC: 8.2 MG/DL — LOW (ref 8.5–10.1)
CHLORIDE SERPL-SCNC: 104 MMOL/L — SIGNIFICANT CHANGE UP (ref 96–108)
CO2 SERPL-SCNC: 28 MMOL/L — SIGNIFICANT CHANGE UP (ref 22–31)
CREAT SERPL-MCNC: 1.06 MG/DL — SIGNIFICANT CHANGE UP (ref 0.5–1.3)
GLUCOSE SERPL-MCNC: 135 MG/DL — HIGH (ref 70–99)
HCT VFR BLD CALC: 37.7 % — LOW (ref 39–50)
HGB BLD-MCNC: 12.8 G/DL — LOW (ref 13–17)
MCHC RBC-ENTMCNC: 29.7 PG — SIGNIFICANT CHANGE UP (ref 27–34)
MCHC RBC-ENTMCNC: 34 GM/DL — SIGNIFICANT CHANGE UP (ref 32–36)
MCV RBC AUTO: 87.5 FL — SIGNIFICANT CHANGE UP (ref 80–100)
NRBC # BLD: 0 /100 WBCS — SIGNIFICANT CHANGE UP (ref 0–0)
PLATELET # BLD AUTO: 220 K/UL — SIGNIFICANT CHANGE UP (ref 150–400)
POTASSIUM SERPL-MCNC: 3.9 MMOL/L — SIGNIFICANT CHANGE UP (ref 3.5–5.3)
POTASSIUM SERPL-SCNC: 3.9 MMOL/L — SIGNIFICANT CHANGE UP (ref 3.5–5.3)
RBC # BLD: 4.31 M/UL — SIGNIFICANT CHANGE UP (ref 4.2–5.8)
RBC # FLD: 12.7 % — SIGNIFICANT CHANGE UP (ref 10.3–14.5)
SODIUM SERPL-SCNC: 139 MMOL/L — SIGNIFICANT CHANGE UP (ref 135–145)
WBC # BLD: 12.52 K/UL — HIGH (ref 3.8–10.5)
WBC # FLD AUTO: 12.52 K/UL — HIGH (ref 3.8–10.5)

## 2018-07-27 PROCEDURE — 72170 X-RAY EXAM OF PELVIS: CPT | Mod: 26

## 2018-07-27 PROCEDURE — 99232 SBSQ HOSP IP/OBS MODERATE 35: CPT

## 2018-07-27 RX ORDER — PANTOPRAZOLE SODIUM 20 MG/1
1 TABLET, DELAYED RELEASE ORAL
Qty: 30 | Refills: 0
Start: 2018-07-27 | End: 2018-08-25

## 2018-07-27 RX ORDER — CELECOXIB 200 MG/1
1 CAPSULE ORAL
Qty: 60 | Refills: 0
Start: 2018-07-27 | End: 2018-08-25

## 2018-07-27 RX ORDER — IBUPROFEN 200 MG
2 TABLET ORAL
Qty: 0 | Refills: 0 | COMMUNITY

## 2018-07-27 RX ORDER — POLYETHYLENE GLYCOL 3350 17 G/17G
17 POWDER, FOR SOLUTION ORAL
Qty: 0 | Refills: 0 | DISCHARGE
Start: 2018-07-27

## 2018-07-27 RX ORDER — DOCUSATE SODIUM 100 MG
1 CAPSULE ORAL
Qty: 0 | Refills: 0 | DISCHARGE
Start: 2018-07-27

## 2018-07-27 RX ORDER — ASCORBIC ACID 60 MG
1 TABLET,CHEWABLE ORAL
Qty: 0 | Refills: 0 | DISCHARGE
Start: 2018-07-27

## 2018-07-27 RX ORDER — ASPIRIN/CALCIUM CARB/MAGNESIUM 324 MG
1 TABLET ORAL
Qty: 60 | Refills: 0
Start: 2018-07-27 | End: 2018-08-25

## 2018-07-27 RX ORDER — OXYCODONE HYDROCHLORIDE 5 MG/1
1 TABLET ORAL
Qty: 30 | Refills: 0
Start: 2018-07-27 | End: 2018-07-31

## 2018-07-27 RX ORDER — ASCORBIC ACID 60 MG
1 TABLET,CHEWABLE ORAL
Qty: 0 | Refills: 0 | COMMUNITY

## 2018-07-27 RX ORDER — ACETAMINOPHEN 500 MG
3 TABLET ORAL
Qty: 0 | Refills: 0 | DISCHARGE
Start: 2018-07-27

## 2018-07-27 RX ORDER — FOLIC ACID 0.8 MG
1 TABLET ORAL
Qty: 0 | Refills: 0 | DISCHARGE
Start: 2018-07-27

## 2018-07-27 RX ADMIN — Medication 975 MILLIGRAM(S): at 14:13

## 2018-07-27 RX ADMIN — CELECOXIB 200 MILLIGRAM(S): 200 CAPSULE ORAL at 06:13

## 2018-07-27 RX ADMIN — OXYCODONE HYDROCHLORIDE 10 MILLIGRAM(S): 5 TABLET ORAL at 02:30

## 2018-07-27 RX ADMIN — OXYCODONE HYDROCHLORIDE 5 MILLIGRAM(S): 5 TABLET ORAL at 05:14

## 2018-07-27 RX ADMIN — Medication 100 MILLIGRAM(S): at 14:13

## 2018-07-27 RX ADMIN — OXYCODONE HYDROCHLORIDE 10 MILLIGRAM(S): 5 TABLET ORAL at 01:30

## 2018-07-27 RX ADMIN — OXYCODONE HYDROCHLORIDE 5 MILLIGRAM(S): 5 TABLET ORAL at 14:13

## 2018-07-27 RX ADMIN — Medication 325 MILLIGRAM(S): at 09:15

## 2018-07-27 RX ADMIN — POLYETHYLENE GLYCOL 3350 17 GRAM(S): 17 POWDER, FOR SOLUTION ORAL at 11:39

## 2018-07-27 RX ADMIN — Medication 500 MILLIGRAM(S): at 05:13

## 2018-07-27 RX ADMIN — Medication 100 MILLIGRAM(S): at 05:13

## 2018-07-27 RX ADMIN — Medication 325 MILLIGRAM(S): at 11:40

## 2018-07-27 RX ADMIN — Medication 975 MILLIGRAM(S): at 05:14

## 2018-07-27 RX ADMIN — Medication 100 MILLIGRAM(S): at 05:15

## 2018-07-27 RX ADMIN — OXYCODONE HYDROCHLORIDE 5 MILLIGRAM(S): 5 TABLET ORAL at 06:14

## 2018-07-27 RX ADMIN — Medication 102 MILLIGRAM(S): at 05:13

## 2018-07-27 RX ADMIN — SODIUM CHLORIDE 3 MILLILITER(S): 9 INJECTION INTRAMUSCULAR; INTRAVENOUS; SUBCUTANEOUS at 05:08

## 2018-07-27 RX ADMIN — Medication 1 MILLIGRAM(S): at 11:39

## 2018-07-27 RX ADMIN — OXYCODONE HYDROCHLORIDE 5 MILLIGRAM(S): 5 TABLET ORAL at 15:13

## 2018-07-27 RX ADMIN — Medication 1 TABLET(S): at 11:39

## 2018-07-27 RX ADMIN — CELECOXIB 200 MILLIGRAM(S): 200 CAPSULE ORAL at 05:13

## 2018-07-27 RX ADMIN — PANTOPRAZOLE SODIUM 40 MILLIGRAM(S): 20 TABLET, DELAYED RELEASE ORAL at 11:39

## 2018-07-27 RX ADMIN — SODIUM CHLORIDE 120 MILLILITER(S): 9 INJECTION, SOLUTION INTRAVENOUS at 05:15

## 2018-07-27 RX ADMIN — Medication 325 MILLIGRAM(S): at 05:13

## 2018-07-27 NOTE — PHYSICAL THERAPY INITIAL EVALUATION ADULT - CRITERIA FOR SKILLED THERAPEUTIC INTERVENTIONS
impairments found/Home with home PT/functional limitations in following categories/risk reduction/prevention/therapy frequency/anticipated discharge recommendation/predicted duration of therapy intervention

## 2018-07-27 NOTE — PHYSICAL THERAPY INITIAL EVALUATION ADULT - GAIT DEVIATIONS NOTED, PT EVAL
decreased velocity of limb motion/increased time in double stance/decreased stride length/decreased weight-shifting ability/decreased step length/decreased dominick

## 2018-07-27 NOTE — PROGRESS NOTE ADULT - PROBLEM SELECTOR PLAN 3
does not take home bp meds, pt. will fu with PMD, mildly elevated now in the setting of pain, so will hold off starting a medication

## 2018-07-27 NOTE — OCCUPATIONAL THERAPY INITIAL EVALUATION ADULT - RANGE OF MOTION EXAMINATION, LOWER EXTREMITY
left LE hip precautions, decreased hip flexion/extension/Right LE Active ROM was WFL   (within functional limits)

## 2018-07-27 NOTE — PHYSICAL THERAPY INITIAL EVALUATION ADULT - GENERAL OBSERVATIONS, REHAB EVAL
Pt seen supine in bed, alert and Ox4, L hip dressing dry and intact, hip abduction pillow intact, L hip precautions verbalized and abided by throughout.

## 2018-07-27 NOTE — OCCUPATIONAL THERAPY INITIAL EVALUATION ADULT - ADDITIONAL COMMENTS
PST-Patient lives in a private home with 3 steps to enter wit a pole to hold on to. Once inside, the patient has 13 steps with a railing on one side to reach main floor where bedroom and bathroom is. The patients bathroom has a shower stall with a regular toilet and owns a commode. The patient ambulates with a quad cane and does not own any other devices for ambulation. The patient is L handed, wears glasses for reading and drives. As per patient "My Wife can help me when I get home with any of my daily tasks and needs".

## 2018-07-27 NOTE — PHYSICAL THERAPY INITIAL EVALUATION ADULT - ACTIVE RANGE OF MOTION EXAMINATION, REHAB EVAL
deficits as listed below/L hip AAROM flexion 85 degrees, L hip AAROM extension -10 degrees. All other extremities WFL

## 2018-07-27 NOTE — PROGRESS NOTE ADULT - SUBJECTIVE AND OBJECTIVE BOX
Patient is a 83y old  Male who presents with a chief complaint of Left Hip Pain (26 Jul 2018 18:58)      INTERVAL HPI/OVERNIGHT EVENTS:  had pain this am, in better control now, no n/v, +flatus    MEDICATIONS  (STANDING):  acetaminophen   Tablet 975 milliGRAM(s) Oral every 8 hours  ascorbic acid 500 milliGRAM(s) Oral two times a day  aspirin enteric coated 325 milliGRAM(s) Oral two times a day  celecoxib 200 milliGRAM(s) Oral two times a day  docusate sodium 100 milliGRAM(s) Oral three times a day  ferrous    sulfate 325 milliGRAM(s) Oral three times a day with meals  folic acid 1 milliGRAM(s) Oral daily  lactated ringers. 1000 milliLiter(s) (120 mL/Hr) IV Continuous <Continuous>  multivitamin 1 Tablet(s) Oral daily  mupirocin 2% Ointment 1 Application(s) Topical two times a day  oxyCODONE    IR 5 milliGRAM(s) Oral every 4 hours  pantoprazole    Tablet 40 milliGRAM(s) Oral daily  polyethylene glycol 3350 17 Gram(s) Oral daily  sodium chloride 0.9% lock flush 3 milliLiter(s) IV Push every 8 hours    MEDICATIONS  (PRN):  aluminum hydroxide/magnesium hydroxide/simethicone Suspension 30 milliLiter(s) Oral four times a day PRN Indigestion  diphenhydrAMINE   Capsule 25 milliGRAM(s) Oral at bedtime PRN Insomnia  HYDROmorphone  Injectable 1 milliGRAM(s) IV Push every 4 hours PRN breakthrough pain  ondansetron Injectable 4 milliGRAM(s) IV Push every 6 hours PRN Nausea and/or Vomiting  oxyCODONE    IR 5 milliGRAM(s) Oral every 4 hours PRN Mild Pain  oxyCODONE    IR 10 milliGRAM(s) Oral every 4 hours PRN Moderate Pain  senna 2 Tablet(s) Oral at bedtime PRN Constipation  zaleplon 5 milliGRAM(s) Oral at bedtime PRN Insomnia      Allergies    No Known Allergies    Intolerances        REVIEW OF SYSTEMS:  CONSTITUTIONAL: No fever, weight loss, + fatigue  EYES: No eye pain, visual disturbances, or discharge  ENMT:  No difficulty hearing, tinnitus, vertigo; No sinus or throat pain  RESPIRATORY: No cough, wheezing, chills or hemoptysis; No shortness of breath  CARDIOVASCULAR: No chest pain, palpitations, dizziness, or leg swelling  GASTROINTESTINAL: No abdominal or epigastric pain. No nausea, vomiting, or hematemesis; No diarrhea or constipation. No melena or hematochezia.  GENITOURINARY: No dysuria, frequency, hematuria, or incontinence  NEUROLOGICAL: No headaches, memory loss, loss of strength, numbness, or tremors  SKIN: No itching, burning, rashes, or lesions   MUSCULOSKELETAL: pain in better control      Vital Signs Last 24 Hrs  T(C): 36.5 (27 Jul 2018 12:57), Max: 36.9 (26 Jul 2018 20:00)  T(F): 97.7 (27 Jul 2018 12:57), Max: 98.4 (26 Jul 2018 20:00)  HR: 78 (27 Jul 2018 13:05) (54 - 96)  BP: 144/77 (27 Jul 2018 13:05) (127/82 - 181/88)  BP(mean): --  RR: 18 (27 Jul 2018 13:05) (14 - 19)  SpO2: 97% (27 Jul 2018 13:05) (96% - 100%)    PHYSICAL EXAM:  GENERAL: NAD, well-groomed, well-developed  HEAD:  Atraumatic, Normocephalic  EYES: EOMI, PERRLA, conjunctiva and sclera clear  ENMT: No tonsillar erythema, exudates, or enlargement; Moist mucous membranes, Good dentition, No lesions  NECK: Supple, No JVD, Normal thyroid  NERVOUS SYSTEM:  Alert & Oriented X3, Good concentration; Motor Strength 5/5 B/L upper and lower extremities  CHEST/LUNG: Clear to percussion bilaterally; No rales, rhonchi, wheezing, or rubs  HEART: Regular rate and rhythm; No murmurs, rubs, or gallops  ABDOMEN: Soft, Nontender, Nondistended; Bowel sounds present  EXTREMITIES:  2+ Peripheral Pulses, left hip with mild erythema and edema, clean    LABS:                        12.8   12.52 )-----------( 220      ( 27 Jul 2018 06:39 )             37.7     07-27    139  |  104  |  23  ----------------------------<  135<H>  3.9   |  28  |  1.06    Ca    8.2<L>      27 Jul 2018 06:40          CAPILLARY BLOOD GLUCOSE          RADIOLOGY & ADDITIONAL TESTS:    Imaging Personally Reviewed:  [ ] YES  [ ] NO    Consultant(s) Notes Reviewed:  [ ] YES  [ ] NO    Care Discussed with Consultants/Other Providers [x ] YES  [ ] NO

## 2018-07-31 LAB — SURGICAL PATHOLOGY FINAL REPORT - CH: SIGNIFICANT CHANGE UP

## 2018-08-02 DIAGNOSIS — Z96.641 PRESENCE OF RIGHT ARTIFICIAL HIP JOINT: ICD-10-CM

## 2018-08-02 DIAGNOSIS — H91.90 UNSPECIFIED HEARING LOSS, UNSPECIFIED EAR: ICD-10-CM

## 2018-08-02 DIAGNOSIS — Z90.49 ACQUIRED ABSENCE OF OTHER SPECIFIED PARTS OF DIGESTIVE TRACT: ICD-10-CM

## 2018-08-02 DIAGNOSIS — M16.12 UNILATERAL PRIMARY OSTEOARTHRITIS, LEFT HIP: ICD-10-CM

## 2018-08-02 DIAGNOSIS — I10 ESSENTIAL (PRIMARY) HYPERTENSION: ICD-10-CM

## 2018-12-02 NOTE — OCCUPATIONAL THERAPY INITIAL EVALUATION ADULT - SITTING BALANCE: STATIC
Telephone Encounter by Candy Perez CMA at 09/05/18 11:44 AM     Author:  Candy Perez CMA Service:  (none) Author Type:  Certified Medical Assistant     Filed:  09/05/18 11:44 AM Encounter Date:  9/4/2018 Status:  Signed     :  Candy Perez CMA (Certified Medical Assistant)            Refilled medication(s) per protocols.[CH1.1M]   BPH Protocol Passed9/4 8:36 PM   Seen in last year or future appt w/in 3 mos    BPH on current meds list       Diuretics Refill Protocol Passed9/4 8:36 PM   Seen in last year or future appt w/in 3 mos    Normal Potassium within last 12 months    Last BP under 140/90 in past year or if patient has diabetes, CAD, or PVD, BP under 130/80 in past year    Normal Creatinine within last 12 months    Normal Sodium within last 12 months    Diuretics on current meds list       Beta Blocker Refill Protocol Passed9/4 8:36 PM   Seen in last year or future appt w/in 3 mos    Last BP under 140/90 in past year or if patient has diabetes, CAD, or PVD, BP under 130/80 in past year    Pulse greater than 49    Beta Blocker on current meds list[CH1.1C]           Revision History        User Key Date/Time User Provider Type Action    > CH1.1 09/05/18 11:44 AM Candy Perez CMA Certified Medical Assistant Sign    C - Copied, M - Manual            
normal balance

## 2019-01-18 NOTE — PHYSICAL THERAPY INITIAL EVALUATION ADULT - ADDITIONAL COMMENTS
Acute on chronic combined systolic and diastolic congestive heart failure Patient lives with wife in private house with 3 steps to enter with pole to hold on. Has 13 steps bedroom with handrails. Half-bath at main level; has no rooms there to stay at temporarily. Bathroom facility at 2nd floor is a shower stall without adaptive devices. Patient will be supported by wife post-op at home. Patient uses a quadcane at baseline, and owns a 3:1 commode from wife's knee surgery; is independent for ADLs and gait. Patient is retired; is  left-handed and drives. Patient denies falls in past 6 months.

## 2019-04-29 NOTE — PATIENT PROFILE ADULT. - NSALCOHOLFREQ_GEN_A_CORE_SD
[FreeTextEntry1] : The patient is referred for cardiology consultation by Dr. Isaac. The primary complaint is fatigue. Over the past 4 months the patient has noted symptoms of marked fatigue. Apparently the syndrome began after a long walk during which she was also exercising with dumbbells. Since that time he has been very sedentary. There have been no accompanying symptoms such as chest pain shortness of breath or palpitations. daily

## 2020-01-09 NOTE — H&P PST ADULT - NSCAFFEINETYPE_GEN_ALL_CORE_SD
Continue Regimen: Triamcinolone 0.025% cream bid x2 weeks on then x2 weeks off Detail Level: Zone Plan: No refills needed at this time coffee

## 2020-02-11 NOTE — PATIENT PROFILE ADULT. - HEALTHCARE QUESTIONS, PROFILE
Osvaldo Lucas Patient Age: 14 year old  MESSAGE:   Dr. Levy would like Osvaldo to see Pediatric Urology for her urinary incontinence.   Dr. Abebe Pang [phone]  Order is placed.       Detailed message left for mother with Dr. Pang's number.        WEIGHT AND HEIGHT:   Wt Readings from Last 1 Encounters:   08/02/19 42.3 kg (93 lb 3.2 oz) (45 %, Z= -0.14)*     * Growth percentiles are based on CDC (Girls, 2-20 Years) data.     Ht Readings from Last 1 Encounters:   08/02/19 5' 0.67\" (1.541 m) (51 %, Z= 0.02)*     * Growth percentiles are based on CDC (Girls, 2-20 Years) data.     BMI Readings from Last 1 Encounters:   08/02/19 17.80 kg/m² (42 %, Z= -0.20)*     * Growth percentiles are based on CDC (Girls, 2-20 Years) data.       ALLERGIES:  Lactose   (food or med) and Pollen  Current Outpatient Medications   Medication   • ondansetron (ZOFRAN ODT) 8 MG disintegrating tablet     No current facility-administered medications for this visit.      PHARMACY to use:           Pharmacy preference(s) on file:   [id number] - [place], [place] - [address] [facility] [address] [facility]  [address] [address]  Phone: [phone] Fax: [phone]    [id number] - [facility] [address] [facility] [address] [facility] [address]  [address]  [address]  [facility] [address]  Phone: [phone] Fax: [phone]      CALL BACK INFO: Ok to leave response (including medical information) with family member or on answering machine  ROUTING: Patient's physician/staff        PCP: Eve Levy MD         INS: Payor: BLUE CROSS BLUE SHIELD [place] / Plan: BLUE CHOICE OPT [id number] / Product Type: PPO MISC   PATIENT ADDRESS:  [address] [facility] [address] [address]    
none

## 2020-10-16 NOTE — H&P PST ADULT - FALLEN IN THE PAST
[FreeTextEntry1] : above was discussed at length with the patient who has an excellent understanding of the issues 
no
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
## 2021-11-07 ENCOUNTER — TRANSCRIPTION ENCOUNTER (OUTPATIENT)
Age: 86
End: 2021-11-07

## 2021-11-07 ENCOUNTER — EMERGENCY (EMERGENCY)
Facility: HOSPITAL | Age: 86
LOS: 0 days | Discharge: HOME | End: 2021-11-07
Attending: STUDENT IN AN ORGANIZED HEALTH CARE EDUCATION/TRAINING PROGRAM
Payer: MEDICARE

## 2021-11-07 VITALS
RESPIRATION RATE: 20 BRPM | OXYGEN SATURATION: 100 % | SYSTOLIC BLOOD PRESSURE: 189 MMHG | HEIGHT: 70 IN | HEART RATE: 64 BPM | TEMPERATURE: 98 F | WEIGHT: 190.04 LBS | DIASTOLIC BLOOD PRESSURE: 81 MMHG

## 2021-11-07 VITALS
DIASTOLIC BLOOD PRESSURE: 82 MMHG | HEART RATE: 77 BPM | OXYGEN SATURATION: 99 % | SYSTOLIC BLOOD PRESSURE: 161 MMHG | RESPIRATION RATE: 16 BRPM | TEMPERATURE: 98 F

## 2021-11-07 DIAGNOSIS — S01.81XA LACERATION WITHOUT FOREIGN BODY OF OTHER PART OF HEAD, INITIAL ENCOUNTER: ICD-10-CM
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
## 2021-11-07 DIAGNOSIS — Z79.82 LONG TERM (CURRENT) USE OF ASPIRIN: ICD-10-CM
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
## 2021-11-07 DIAGNOSIS — Z90.89 ACQUIRED ABSENCE OF OTHER ORGANS: Chronic | ICD-10-CM

## 2021-11-07 DIAGNOSIS — Z90.49 ACQUIRED ABSENCE OF OTHER SPECIFIED PARTS OF DIGESTIVE TRACT: Chronic | ICD-10-CM

## 2021-11-07 PROBLEM — H91.90 UNSPECIFIED HEARING LOSS, UNSPECIFIED EAR: Chronic | Status: ACTIVE | Noted: 2018-07-11

## 2021-11-07 PROCEDURE — 70450 CT HEAD/BRAIN W/O DYE: CPT | Mod: 26,MA

## 2021-11-07 PROCEDURE — 12014 RPR F/E/E/N/L/M 5.1-7.5 CM: CPT

## 2021-11-07 PROCEDURE — 70486 CT MAXILLOFACIAL W/O DYE: CPT | Mod: 26,MA

## 2021-11-07 PROCEDURE — 99284 EMERGENCY DEPT VISIT MOD MDM: CPT | Mod: 25

## 2021-11-07 RX ORDER — CEPHALEXIN 500 MG
500 CAPSULE ORAL ONCE
Refills: 0 | Status: COMPLETED | OUTPATIENT
Start: 2021-11-07 | End: 2021-11-07

## 2021-11-07 RX ORDER — CEPHALEXIN 500 MG
1 CAPSULE ORAL
Qty: 10 | Refills: 0
Start: 2021-11-07 | End: 2021-11-11

## 2021-11-07 RX ADMIN — Medication 500 MILLIGRAM(S): at 21:11

## 2021-11-07 NOTE — ED PROVIDER NOTE - OBJECTIVE STATEMENT
86m no relevant pmhx. not anticoagulted. tripped and fell today. landed on face. no loc. now with bleeding to left side of face. no pain to anywhere in his body. ambulating without issue since the trip and fall.

## 2021-11-07 NOTE — ED ADULT NURSE NOTE - OBJECTIVE STATEMENT
85 y/o male BIBA s/p mechanical trip and fall at home, witnessed by wife, - LOC, patient received tetanus at urgent care prior to arrival

## 2021-11-07 NOTE — ED PROVIDER NOTE - NSFOLLOWUPINSTRUCTIONS_ED_ALL_ED_FT
return to the emergency department or go to your doctor in 1 week for removal of stitches.     take antibiotics as prescribed    return to the emergency room immediately for signs or symptoms of infection.    keep the wounds dry and covered for the next 24 hours. after that you may shower, but do not aggressively rub the wound.

## 2021-11-07 NOTE — ED PROVIDER NOTE - PATIENT PORTAL LINK FT
You can access the FollowMyHealth Patient Portal offered by [facility] by registering at the following website: [email or web address]. By joining [employer]’s FollowMyHealth portal, you will also be able to view your health information using other applications (apps) compatible with our system.

## 2021-11-07 NOTE — ED PROVIDER NOTE - NSICDXFAMILYHX_GEN_ALL_CORE_FT
FAMILY HISTORY:  Father  Still living? No  Family history of prostate cancer in father, Age at diagnosis: Age Unknown

## 2021-11-07 NOTE — ED PROVIDER NOTE - CLINICAL SUMMARY MEDICAL DECISION MAKING FREE TEXT BOX
trip and fall. no syncopal event. trip and fall. no syncopal event. ct without actionable pathology. lacerations cleaned and repaired. considering depth of forehead laceration will provide prophylactic antibiotics. return precuations given. received adacel from urgent care today prior to ed arrival

## 2021-11-07 NOTE — ED PROVIDER NOTE - PHYSICAL EXAMINATION
General: Awake, alert and oriented. No acute distress. Well developed, hydrated and nourished. Appears stated age.   Skin: Skin in warm, dry and intact without rashes or lesions. Appropriate color for ethnicity  HENMT: head normocephalic, horizontal alceration running 5cm parallel to and immediate above left eyebrow with mild oozing, extending to submucosal tiddue, no foreign body. jagged 2 cm laceration inferior to left eye with no active bleeding or foreign body. bilateral external ears without swelling. no nasal discharge. moist oral mucosa. supple neck, trachea midline  EYES: Conjunctiva clear. nonicteric sclera. EOM intact, Eyelids are normal in appearance without swelling or lesions.  Cardiac: well perfused  Respiratory: breathing comfortably on room air. no audible wheezing or stridor  Abdominal: nondistended  MSK: Neck and back are without deformity, visible external skin changes, or signs of trauma. Curvature of the cervical, thoracic, and lumbar spine are within normal limits. no external signs of trauma. no apparent deficits in ROM of any extremity  Neurological: The patient is awake, alert and oriented to person, place, and time with normal speech. CN 2-12 grossly intact. no apparent deficits. Memory is normal and thought process is intact. No gait abnormalities are appreciated.   Psychiatric: Appropriate mood and affect. Good judgement and insight. No visual or auditory hallucinations.

## 2022-02-10 NOTE — H&P PST ADULT - NSCAFFEAMTFREQ_GEN_ALL_CORE_SD
Problem: Falls - Risk of:  Goal: Will remain free from falls  Description: Will remain free from falls  2/10/2022 1555 by Daniela Jacobsen RN  Outcome: Ongoing  2/10/2022 0237 by Jalyn Funes RN  Outcome: Ongoing  Goal: Absence of physical injury  Description: Absence of physical injury  2/10/2022 1555 by Daniela Jacobsen RN  Outcome: Ongoing  2/10/2022 0237 by Jalyn Funes RN  Outcome: Ongoing     Problem: Safety:  Goal: Free from accidental physical injury  Description: Free from accidental physical injury  2/10/2022 1555 by Daniela Jacobsen RN  Outcome: Ongoing  2/10/2022 0237 by Jalyn Funes RN  Outcome: Ongoing  Goal: Free from intentional harm  Description: Free from intentional harm  2/10/2022 1555 by Daniela Jacobsen RN  Outcome: Ongoing  2/10/2022 0237 by Jalyn Funes RN  Outcome: Ongoing     Problem: Daily Care:  Goal: Daily care needs are met  Description: Daily care needs are met  2/10/2022 1555 by Daniela Jacobsen RN  Outcome: Ongoing  2/10/2022 0237 by Jalyn Funes RN  Outcome: Ongoing     Problem: Pain:  Goal: Patient's pain/discomfort is manageable  Description: Patient's pain/discomfort is manageable  2/10/2022 1555 by Daniela Jacobsen RN  Outcome: Ongoing  2/10/2022 0237 by Jalyn Funes RN  Outcome: Ongoing  Goal: Pain level will decrease  Description: Pain level will decrease  2/10/2022 1555 by Daniela Jacobsen RN  Outcome: Ongoing  2/10/2022 0237 by Jalyn Funes RN  Outcome: Ongoing  Goal: Control of acute pain  Description: Control of acute pain  2/10/2022 1555 by Daniela Jacobsen RN  Outcome: Ongoing  2/10/2022 0237 by Jalyn Funes RN  Outcome: Ongoing  Goal: Control of chronic pain  Description: Control of chronic pain  2/10/2022 1555 by Daniela Jacobsen RN  Outcome: Ongoing  2/10/2022 0237 by Jalyn Funes RN  Outcome: Ongoing     Problem: Skin Integrity:  Goal: Skin integrity will stabilize  Description: Skin integrity will stabilize  2/10/2022 1555 by Angela Huynh Eriberto Kan RN  Outcome: Ongoing  2/10/2022 0237 by John Cornejo RN  Outcome: Ongoing 1-2 cups/cans per day

## 2022-11-07 ENCOUNTER — NON-APPOINTMENT (OUTPATIENT)
Age: 87
End: 2022-11-07

## 2022-11-25 PROBLEM — Z00.00 ENCOUNTER FOR PREVENTIVE HEALTH EXAMINATION: Status: ACTIVE | Noted: 2022-11-25

## 2022-12-13 ENCOUNTER — APPOINTMENT (OUTPATIENT)
Dept: ORTHOPEDIC SURGERY | Facility: CLINIC | Age: 87
End: 2022-12-13

## 2022-12-15 ENCOUNTER — APPOINTMENT (OUTPATIENT)
Dept: ORTHOPEDIC SURGERY | Facility: CLINIC | Age: 87
End: 2022-12-15

## 2023-06-15 ENCOUNTER — INPATIENT (INPATIENT)
Facility: HOSPITAL | Age: 88
LOS: 7 days | Discharge: NURSING, REHAB OR LONG-TERM CARE | End: 2023-06-23
Attending: INTERNAL MEDICINE | Admitting: INTERNAL MEDICINE
Payer: MEDICARE

## 2023-06-15 VITALS
DIASTOLIC BLOOD PRESSURE: 74 MMHG | TEMPERATURE: 99 F | OXYGEN SATURATION: 96 % | HEIGHT: 71 IN | WEIGHT: 169.98 LBS | RESPIRATION RATE: 18 BRPM | SYSTOLIC BLOOD PRESSURE: 131 MMHG | HEART RATE: 82 BPM

## 2023-06-15 DIAGNOSIS — Z90.49 ACQUIRED ABSENCE OF OTHER SPECIFIED PARTS OF DIGESTIVE TRACT: Chronic | ICD-10-CM

## 2023-06-15 DIAGNOSIS — Z90.89 ACQUIRED ABSENCE OF OTHER ORGANS: Chronic | ICD-10-CM

## 2023-06-15 LAB
ALBUMIN SERPL ELPH-MCNC: 3.1 G/DL — LOW (ref 3.3–5)
ALP SERPL-CCNC: 87 U/L — SIGNIFICANT CHANGE UP (ref 40–120)
ALT FLD-CCNC: 23 U/L — SIGNIFICANT CHANGE UP (ref 12–78)
ANION GAP SERPL CALC-SCNC: 5 MMOL/L — SIGNIFICANT CHANGE UP (ref 5–17)
APPEARANCE UR: ABNORMAL
APTT BLD: 33.6 SEC — SIGNIFICANT CHANGE UP (ref 27.5–35.5)
AST SERPL-CCNC: 56 U/L — HIGH (ref 15–37)
BACTERIA # UR AUTO: ABNORMAL
BASOPHILS # BLD AUTO: 0.01 K/UL — SIGNIFICANT CHANGE UP (ref 0–0.2)
BASOPHILS NFR BLD AUTO: 0.1 % — SIGNIFICANT CHANGE UP (ref 0–2)
BILIRUB SERPL-MCNC: 0.9 MG/DL — SIGNIFICANT CHANGE UP (ref 0.2–1.2)
BILIRUB UR-MCNC: NEGATIVE — SIGNIFICANT CHANGE UP
BUN SERPL-MCNC: 20 MG/DL — SIGNIFICANT CHANGE UP (ref 7–23)
CALCIUM SERPL-MCNC: 8.7 MG/DL — SIGNIFICANT CHANGE UP (ref 8.5–10.1)
CHLORIDE SERPL-SCNC: 101 MMOL/L — SIGNIFICANT CHANGE UP (ref 96–108)
CO2 SERPL-SCNC: 27 MMOL/L — SIGNIFICANT CHANGE UP (ref 22–31)
COLOR SPEC: ABNORMAL
CREAT SERPL-MCNC: 1.04 MG/DL — SIGNIFICANT CHANGE UP (ref 0.5–1.3)
DIFF PNL FLD: ABNORMAL
EGFR: 70 ML/MIN/1.73M2 — SIGNIFICANT CHANGE UP
EOSINOPHIL # BLD AUTO: 0.04 K/UL — SIGNIFICANT CHANGE UP (ref 0–0.5)
EOSINOPHIL NFR BLD AUTO: 0.4 % — SIGNIFICANT CHANGE UP (ref 0–6)
GLUCOSE SERPL-MCNC: 112 MG/DL — HIGH (ref 70–99)
GLUCOSE UR QL: NEGATIVE MG/DL — SIGNIFICANT CHANGE UP
HCT VFR BLD CALC: 36.3 % — LOW (ref 39–50)
HGB BLD-MCNC: 11.6 G/DL — LOW (ref 13–17)
IMM GRANULOCYTES NFR BLD AUTO: 0.3 % — SIGNIFICANT CHANGE UP (ref 0–0.9)
INR BLD: 1.2 RATIO — HIGH (ref 0.88–1.16)
KETONES UR-MCNC: ABNORMAL
LEUKOCYTE ESTERASE UR-ACNC: ABNORMAL
LYMPHOCYTES # BLD AUTO: 1.14 K/UL — SIGNIFICANT CHANGE UP (ref 1–3.3)
LYMPHOCYTES # BLD AUTO: 12.3 % — LOW (ref 13–44)
MAGNESIUM SERPL-MCNC: 2.1 MG/DL — SIGNIFICANT CHANGE UP (ref 1.6–2.6)
MCHC RBC-ENTMCNC: 26.7 PG — LOW (ref 27–34)
MCHC RBC-ENTMCNC: 32 G/DL — SIGNIFICANT CHANGE UP (ref 32–36)
MCV RBC AUTO: 83.6 FL — SIGNIFICANT CHANGE UP (ref 80–100)
MONOCYTES # BLD AUTO: 0.74 K/UL — SIGNIFICANT CHANGE UP (ref 0–0.9)
MONOCYTES NFR BLD AUTO: 8 % — SIGNIFICANT CHANGE UP (ref 2–14)
NEUTROPHILS # BLD AUTO: 7.33 K/UL — SIGNIFICANT CHANGE UP (ref 1.8–7.4)
NEUTROPHILS NFR BLD AUTO: 78.9 % — HIGH (ref 43–77)
NITRITE UR-MCNC: NEGATIVE — SIGNIFICANT CHANGE UP
NRBC # BLD: 0 /100 WBCS — SIGNIFICANT CHANGE UP (ref 0–0)
PH UR: 6.5 — SIGNIFICANT CHANGE UP (ref 5–8)
PLATELET # BLD AUTO: 254 K/UL — SIGNIFICANT CHANGE UP (ref 150–400)
POTASSIUM SERPL-MCNC: 5.3 MMOL/L — SIGNIFICANT CHANGE UP (ref 3.5–5.3)
POTASSIUM SERPL-SCNC: 5.3 MMOL/L — SIGNIFICANT CHANGE UP (ref 3.5–5.3)
PROT SERPL-MCNC: 7.9 GM/DL — SIGNIFICANT CHANGE UP (ref 6–8.3)
PROT UR-MCNC: 500 MG/DL
PROTHROM AB SERPL-ACNC: 14.4 SEC — HIGH (ref 10.5–13.4)
RBC # BLD: 4.34 M/UL — SIGNIFICANT CHANGE UP (ref 4.2–5.8)
RBC # FLD: 14.5 % — SIGNIFICANT CHANGE UP (ref 10.3–14.5)
RBC CASTS # UR COMP ASSIST: >50 /HPF (ref 0–4)
SODIUM SERPL-SCNC: 133 MMOL/L — LOW (ref 135–145)
SP GR SPEC: 1.01 — SIGNIFICANT CHANGE UP (ref 1.01–1.02)
TROPONIN I, HIGH SENSITIVITY RESULT: 9.4 NG/L — SIGNIFICANT CHANGE UP
UROBILINOGEN FLD QL: NEGATIVE MG/DL — SIGNIFICANT CHANGE UP
WBC # BLD: 9.29 K/UL — SIGNIFICANT CHANGE UP (ref 3.8–10.5)
WBC # FLD AUTO: 9.29 K/UL — SIGNIFICANT CHANGE UP (ref 3.8–10.5)
WBC UR QL: >50

## 2023-06-15 PROCEDURE — 99223 1ST HOSP IP/OBS HIGH 75: CPT

## 2023-06-15 PROCEDURE — 99285 EMERGENCY DEPT VISIT HI MDM: CPT

## 2023-06-15 PROCEDURE — 71045 X-RAY EXAM CHEST 1 VIEW: CPT | Mod: 26

## 2023-06-15 PROCEDURE — 93010 ELECTROCARDIOGRAM REPORT: CPT

## 2023-06-15 RX ORDER — ASCORBIC ACID 60 MG
500 TABLET,CHEWABLE ORAL DAILY
Refills: 0 | Status: DISCONTINUED | OUTPATIENT
Start: 2023-06-15 | End: 2023-06-23

## 2023-06-15 RX ORDER — CEFTRIAXONE 500 MG/1
1000 INJECTION, POWDER, FOR SOLUTION INTRAMUSCULAR; INTRAVENOUS EVERY 24 HOURS
Refills: 0 | Status: COMPLETED | OUTPATIENT
Start: 2023-06-16 | End: 2023-06-17

## 2023-06-15 RX ORDER — SODIUM CHLORIDE 9 MG/ML
1000 INJECTION INTRAMUSCULAR; INTRAVENOUS; SUBCUTANEOUS ONCE
Refills: 0 | Status: COMPLETED | OUTPATIENT
Start: 2023-06-15 | End: 2023-06-15

## 2023-06-15 RX ORDER — SODIUM CHLORIDE 9 MG/ML
1000 INJECTION, SOLUTION INTRAVENOUS
Refills: 0 | Status: DISCONTINUED | OUTPATIENT
Start: 2023-06-15 | End: 2023-06-20

## 2023-06-15 RX ORDER — PANTOPRAZOLE SODIUM 20 MG/1
40 TABLET, DELAYED RELEASE ORAL
Refills: 0 | Status: DISCONTINUED | OUTPATIENT
Start: 2023-06-15 | End: 2023-06-23

## 2023-06-15 RX ORDER — ENOXAPARIN SODIUM 100 MG/ML
40 INJECTION SUBCUTANEOUS EVERY 24 HOURS
Refills: 0 | Status: DISCONTINUED | OUTPATIENT
Start: 2023-06-15 | End: 2023-06-23

## 2023-06-15 RX ORDER — ACETAMINOPHEN 500 MG
650 TABLET ORAL EVERY 6 HOURS
Refills: 0 | Status: DISCONTINUED | OUTPATIENT
Start: 2023-06-15 | End: 2023-06-23

## 2023-06-15 RX ORDER — CEFTRIAXONE 500 MG/1
1000 INJECTION, POWDER, FOR SOLUTION INTRAMUSCULAR; INTRAVENOUS ONCE
Refills: 0 | Status: COMPLETED | OUTPATIENT
Start: 2023-06-15 | End: 2023-06-15

## 2023-06-15 RX ORDER — LANOLIN ALCOHOL/MO/W.PET/CERES
3 CREAM (GRAM) TOPICAL AT BEDTIME
Refills: 0 | Status: DISCONTINUED | OUTPATIENT
Start: 2023-06-15 | End: 2023-06-23

## 2023-06-15 RX ORDER — POLYETHYLENE GLYCOL 3350 17 G/17G
17 POWDER, FOR SOLUTION ORAL DAILY
Refills: 0 | Status: DISCONTINUED | OUTPATIENT
Start: 2023-06-15 | End: 2023-06-23

## 2023-06-15 RX ORDER — FOLIC ACID 0.8 MG
1 TABLET ORAL DAILY
Refills: 0 | Status: DISCONTINUED | OUTPATIENT
Start: 2023-06-15 | End: 2023-06-23

## 2023-06-15 RX ADMIN — Medication 1 TABLET(S): at 18:15

## 2023-06-15 RX ADMIN — Medication 650 MILLIGRAM(S): at 19:55

## 2023-06-15 RX ADMIN — SODIUM CHLORIDE 1000 MILLILITER(S): 9 INJECTION INTRAMUSCULAR; INTRAVENOUS; SUBCUTANEOUS at 14:36

## 2023-06-15 RX ADMIN — Medication 650 MILLIGRAM(S): at 18:14

## 2023-06-15 RX ADMIN — ENOXAPARIN SODIUM 40 MILLIGRAM(S): 100 INJECTION SUBCUTANEOUS at 18:14

## 2023-06-15 RX ADMIN — SODIUM CHLORIDE 50 MILLILITER(S): 9 INJECTION, SOLUTION INTRAVENOUS at 18:17

## 2023-06-15 RX ADMIN — CEFTRIAXONE 100 MILLIGRAM(S): 500 INJECTION, POWDER, FOR SOLUTION INTRAMUSCULAR; INTRAVENOUS at 17:53

## 2023-06-15 RX ADMIN — Medication 500 MILLIGRAM(S): at 18:15

## 2023-06-15 NOTE — ED ADULT NURSE NOTE - CHIEF COMPLAINT QUOTE
Patient sent home from MD office after having routine blood work completed and telling the doctor he felt weak.  Family called 911 once patient got home to have him brought to the hospital for evaluation.  Patient states he can't stand, feels very weak.

## 2023-06-15 NOTE — ED PROVIDER NOTE - PROGRESS NOTE DETAILS
pt's wife Genny [phone] is called and no answer. Genny pt's wife is here sts pt has a hx of prostate ca and supposed to have prostate radiation therapy in 5 days. oncologist is Dr. Andersen [phone] and urologist is Dr. Singh [phone]. Dr. James is notified and admit pt. Dr. James is notified and admit pt. Wife Genny burdick pt just had coreas catheter inserted at his urologist office this morning.

## 2023-06-15 NOTE — ED PROVIDER NOTE - NSICDXPASTMEDICALHX_GEN_ALL_CORE_FT
PAST MEDICAL HISTORY:  Hearing impairment      PAST MEDICAL HISTORY:  Hearing impairment     Hypertension     Prostate CA

## 2023-06-15 NOTE — H&P ADULT - HISTORY OF PRESENT ILLNESS
89 yo M w/ PMHx of Dementia, Prostate Ca and [place]'kmaq presents due to generalized weakness. Reports he feels so weak that he was having trouble to put on his shirt today. Pt has chronic coeras, it was replaced in his urologists office earlier today and found to have dark, malodorous urine. Pt denies headache, dizziness, sob, chest pain, nausea, vomiting, fever, chills, abd pain.    Pt alert and oriented x1, poor historian due to memory loss. Unsure of baseline mental status

## 2023-06-15 NOTE — ED ADULT NURSE NOTE - NSFALLHARMRISKINTERV_ED_ALL_ED

## 2023-06-15 NOTE — PATIENT PROFILE ADULT - FUNCTIONAL ASSESSMENT - BASIC MOBILITY 6.
1-calculated by average/Not able to assess (calculate score using [facility] averaging method)

## 2023-06-15 NOTE — ED ADULT TRIAGE NOTE - CHIEF COMPLAINT QUOTE
Patient sent home from MD office after having routine blood work completed and telling the doctor he felt weak.  Family called 911 once patient got home to have him brought to the hospital for evaluation.  Patient states he can't stand, feels very weak. MDD (major depressive disorder), recurrent severe, without psychosis

## 2023-06-15 NOTE — ED PROVIDER NOTE - CLINICAL SUMMARY MEDICAL DECISION MAKING FREE TEXT BOX
hx, exam pt c/o generalized weakness ua wbc > 50 coreas will be changed and pt will be admitted to hosptial pt has a hx of prostate ca.

## 2023-06-15 NOTE — PATIENT PROFILE ADULT - FALL HARM RISK - HARM RISK INTERVENTIONS

## 2023-06-15 NOTE — ED PROVIDER NOTE - OBJECTIVE STATEMENT
89 years old male by ems from home c/o generalized weakness that he was having trouble to put on his sweat shirt today. Pt denies headache, dizziness, blurred visions, light sensitivities, focal/distal weakness or numbness, neck/back/hips/calfs pain, sob, chest pain, nausea, vomiting, fever, chills, abd pain. Pt sts he has had a coreas catheter inserted for couple of months now.

## 2023-06-15 NOTE — ED ADULT NURSE NOTE - OBJECTIVE STATEMENT
patient alert and oriented x3, but poor historian came in for weakness. As per triage patient went to PCP today, felt weak and got blood work, family sent pt to ER due to weakness. pt complains of generalized weakness and pain. upon assessment, pt has 20f coreas catheter unaware of insertion date. unknown pmh.

## 2023-06-15 NOTE — H&P ADULT - ASSESSMENT
#UTI #Chronic coreas 2/2 to Prostate Ca  - UA: Many bacteria  - Ceftriaxone 1g x3days  - Coreas changes earlier today in patient's urology office    #Hyperkalemia  - Mild, 5.3  - Lokelma x1  - F/u BMP     Code: Full  VTE: Lovenox  Diet: Regular

## 2023-06-15 NOTE — ED PROVIDER NOTE - GENITOURINARY BLADDER
+ coreas catheter [place] 20 in placed with water melan bright red urine 30 cc in the leg bag/non-tender

## 2023-06-15 NOTE — ED PROVIDER NOTE - EYES, MLM
Clear bilaterally, pupils equal, round and reactive to light. no slcera icterus no photophobia bilaterally

## 2023-06-15 NOTE — H&P ADULT - NSHPLABSRESULTS_GEN_ALL_CORE
11.6   9.29  )-----------( 254      ( 15 Maximilian 2023 14:33 )             36.3       06-15    133<L>  |  101  |  20  ----------------------------<  112<H>  5.3   |  27  |  1.04    Ca    8.7      15 Maximilian 2023 14:33  Mg     2.1     06-15    TPro  7.9  /  Alb  3.1<L>  /  TBili  0.9  /  DBili  x   /  AST  56<H>  /  ALT  23  /  AlkPhos  87  06-15              PT/INR - ( 15 Maximilian 2023 14:33 )   PT: 14.4 sec;   INR: 1.20 ratio         PTT - ( 15 Maximilian 2023 14:33 )  PTT:33.6 sec

## 2023-06-15 NOTE — ED PROVIDER NOTE - CPE EDP CARDIAC NORM
1.  You were seen in the [facility] today by GEM Philip. If you have any questions or concerns after your appointment, please call [phone].    2.  Plan is to return to clinic with GEM Philip in 6 months with an audiogram.    Thank you!  Lorena Coyle RN   
- - -

## 2023-06-15 NOTE — H&P ADULT - NSHPPHYSICALEXAM_GEN_ALL_CORE
T(C): 38 (06-16-23 @ 00:10), Max: 38.2 (06-15-23 @ 18:06)  HR: 75 (06-16-23 @ 00:10) (71 - 86)  BP: 147/72 (06-16-23 @ 00:10) (110/68 - 161/81)  RR: 18 (06-16-23 @ 00:10) (16 - 18)  SpO2: 95% (06-16-23 @ 00:10) (95% - 98%)    CONSTITUTIONAL: No apparent distress  EYES: PERRLA and symmetric, EOMI, No conjunctival or scleral injection, non-icteric  ENMT: Oral mucosa with moist membranes.  NECK: Supple, symmetric. No JVD.  RESP: No respiratory distress, no use of accessory muscles; CTA b/l, no WRR  CV: RRR, +S1S2, no MRG  GI: Soft, NT, ND, no rebound, no guarding  : No suprapubic tenderness. No CVA tenderness. Harvey in place draining dark urine, POA.  LYMPH: No cervical LAD or tenderness  MSK: No spinal tenderness, normal muscle strength/tone  EXTREMITIES: No pedal edema  SKIN: No rashes or ulcers noted  NEURO: A+Ox1, responsive. No tremor, sensation intact in upper and lower extremities b/l  PSYCH: Mood and affect appropriate

## 2023-06-16 DIAGNOSIS — E87.5 HYPERKALEMIA: ICD-10-CM

## 2023-06-16 DIAGNOSIS — N39.0 URINARY TRACT INFECTION, SITE NOT SPECIFIED: ICD-10-CM

## 2023-06-16 LAB
A1C WITH ESTIMATED AVERAGE GLUCOSE RESULT: 5.9 % — HIGH (ref 4–5.6)
ALBUMIN SERPL ELPH-MCNC: 2.5 G/DL — LOW (ref 3.3–5)
ALP SERPL-CCNC: 75 U/L — SIGNIFICANT CHANGE UP (ref 40–120)
ALT FLD-CCNC: 15 U/L — SIGNIFICANT CHANGE UP (ref 12–78)
ANION GAP SERPL CALC-SCNC: 7 MMOL/L — SIGNIFICANT CHANGE UP (ref 5–17)
AST SERPL-CCNC: 13 U/L — LOW (ref 15–37)
BILIRUB SERPL-MCNC: 0.7 MG/DL — SIGNIFICANT CHANGE UP (ref 0.2–1.2)
BUN SERPL-MCNC: 17 MG/DL — SIGNIFICANT CHANGE UP (ref 7–23)
CALCIUM SERPL-MCNC: 8 MG/DL — LOW (ref 8.5–10.1)
CHLORIDE SERPL-SCNC: 103 MMOL/L — SIGNIFICANT CHANGE UP (ref 96–108)
CHOLEST SERPL-MCNC: 118 MG/DL — SIGNIFICANT CHANGE UP
CO2 SERPL-SCNC: 25 MMOL/L — SIGNIFICANT CHANGE UP (ref 22–31)
CREAT SERPL-MCNC: 0.84 MG/DL — SIGNIFICANT CHANGE UP (ref 0.5–1.3)
EGFR: 85 ML/MIN/1.73M2 — SIGNIFICANT CHANGE UP
ESTIMATED AVERAGE GLUCOSE: 123 MG/DL — HIGH (ref 68–114)
GLUCOSE SERPL-MCNC: 126 MG/DL — HIGH (ref 70–99)
HCT VFR BLD CALC: 30 % — LOW (ref 39–50)
HDLC SERPL-MCNC: 38 MG/DL — LOW
HGB BLD-MCNC: 9.7 G/DL — LOW (ref 13–17)
LIPID PNL WITH DIRECT LDL SERPL: 67 MG/DL — SIGNIFICANT CHANGE UP
MCHC RBC-ENTMCNC: 26.6 PG — LOW (ref 27–34)
MCHC RBC-ENTMCNC: 32.3 G/DL — SIGNIFICANT CHANGE UP (ref 32–36)
MCV RBC AUTO: 82.4 FL — SIGNIFICANT CHANGE UP (ref 80–100)
NON HDL CHOLESTEROL: 80 MG/DL — SIGNIFICANT CHANGE UP
NRBC # BLD: 0 /100 WBCS — SIGNIFICANT CHANGE UP (ref 0–0)
PLATELET # BLD AUTO: 207 K/UL — SIGNIFICANT CHANGE UP (ref 150–400)
POTASSIUM SERPL-MCNC: 3.7 MMOL/L — SIGNIFICANT CHANGE UP (ref 3.5–5.3)
POTASSIUM SERPL-SCNC: 3.7 MMOL/L — SIGNIFICANT CHANGE UP (ref 3.5–5.3)
PROT SERPL-MCNC: 6.5 GM/DL — SIGNIFICANT CHANGE UP (ref 6–8.3)
RBC # BLD: 3.64 M/UL — LOW (ref 4.2–5.8)
RBC # FLD: 14.3 % — SIGNIFICANT CHANGE UP (ref 10.3–14.5)
SODIUM SERPL-SCNC: 135 MMOL/L — SIGNIFICANT CHANGE UP (ref 135–145)
TRIGL SERPL-MCNC: 64 MG/DL — SIGNIFICANT CHANGE UP
WBC # BLD: 7.48 K/UL — SIGNIFICANT CHANGE UP (ref 3.8–10.5)
WBC # FLD AUTO: 7.48 K/UL — SIGNIFICANT CHANGE UP (ref 3.8–10.5)

## 2023-06-16 PROCEDURE — 99232 SBSQ HOSP IP/OBS MODERATE 35: CPT

## 2023-06-16 RX ORDER — SODIUM ZIRCONIUM CYCLOSILICATE 10 G/10G
10 POWDER, FOR SUSPENSION ORAL ONCE
Refills: 0 | Status: COMPLETED | OUTPATIENT
Start: 2023-06-16 | End: 2023-06-16

## 2023-06-16 RX ADMIN — Medication 500 MILLIGRAM(S): at 12:14

## 2023-06-16 RX ADMIN — POLYETHYLENE GLYCOL 3350 17 GRAM(S): 17 POWDER, FOR SOLUTION ORAL at 12:14

## 2023-06-16 RX ADMIN — CEFTRIAXONE 100 MILLIGRAM(S): 500 INJECTION, POWDER, FOR SOLUTION INTRAMUSCULAR; INTRAVENOUS at 17:51

## 2023-06-16 RX ADMIN — Medication 1 MILLIGRAM(S): at 12:14

## 2023-06-16 RX ADMIN — PANTOPRAZOLE SODIUM 40 MILLIGRAM(S): 20 TABLET, DELAYED RELEASE ORAL at 06:06

## 2023-06-16 RX ADMIN — Medication 650 MILLIGRAM(S): at 13:35

## 2023-06-16 RX ADMIN — ENOXAPARIN SODIUM 40 MILLIGRAM(S): 100 INJECTION SUBCUTANEOUS at 17:52

## 2023-06-16 RX ADMIN — Medication 650 MILLIGRAM(S): at 14:35

## 2023-06-16 RX ADMIN — SODIUM ZIRCONIUM CYCLOSILICATE 10 GRAM(S): 10 POWDER, FOR SUSPENSION ORAL at 06:04

## 2023-06-16 RX ADMIN — Medication 1 TABLET(S): at 12:14

## 2023-06-16 NOTE — PROGRESS NOTE ADULT - ASSESSMENT
89 yo M w/ PMHx of Dementia, Prostate Ca and [place] presents due to generalized weakness. Reports he feels so weak that he was having trouble to put on his shirt today. Pt has chronic coreas, it was replaced in his urologists office earlier today and found to have dark, malodorous urine. Pt denies headache, dizziness, sob, chest pain, nausea, vomiting, fever, chills, abd pain.    Pt alert and oriented x1, poor historian due to memory loss. Unsure of baseline mental status    UTI #associated with Chronic coreas 2/2 to Prostate Ca  - UA: Many bacteria  - Ceftriaxone 1g x 7 days  - Coreas changes earlier today in patient's urology office. Follow urine cultures.   still has low grade fever.     #Hyperkalemia  - improved.     Dementia. supportive care.     Anemia of chronic disease. Hb dropped. Patient does have blood tinged urine (Hematuria). could be hemodilution with IVF. Trend CBC.     Code: Full  VTE: Lovenox with caution. Might need to hold if any worsening hematuria.   Diet: Regular

## 2023-06-16 NOTE — PROGRESS NOTE ADULT - SUBJECTIVE AND OBJECTIVE BOX
CHIEF COMPLAINT: Follow up of coreas associated UTI  + fever  no confusion at this time  no chest pain or sob reported   blood tinged urine in the bag.       PHYSICAL EXAM:    GENERAL: Moderately built, no acute distress   CHEST/LUNG:  No wheezing, no crackles   HEART: Regular rate and rhythm; No murmurs  ABDOMEN: Soft, Nontender, Nondistended; Bowel sounds present. + coreas  EXTREMITIES:  No clubbing, cyanosis, or edema  NERVOUS SYSTEM:  Grossly non focal.  Psychiatry: AAO x name,  and location only. not to time and year      OBJECTIVE DATA:   Vital Signs Last 24 Hrs  T(C): 36.8 (2023 05:37), Max: 38.2 (15 Maximilian 2023 18:06)  T(F): 98.2 (2023 05:37), Max: 100.7 (15 Maximilian 2023 18:06)  HR: 67 (2023 05:37) (67 - 86)  BP: 145/61 (2023 05:37) (110/68 - 161/81)  BP(mean): --  RR: 18 (2023 05:37) (16 - 18)  SpO2: 96% (2023 05:37) (95% - 98%)    Parameters below as of 2023 05:37  Patient On (Oxygen Delivery Method): room air               Daily Height in cm: 180.34 (15 Maximilian 2023 13:44)    Daily Weight in k.5 (15 Maximilian 2023 20:55)  LABS:                        9.7    7.48  )-----------( 207      ( 2023 06:50 )             30.0             06-16    135  |  103  |  17  ----------------------------<  126<H>  3.7   |  25  |  0.84    Ca    8.0<L>      2023 06:50  Mg     2.1     06-15    TPro  6.5  /  Alb  2.5<L>  /  TBili  0.7  /  DBili  x   /  AST  13<L>  /  ALT  15  /  AlkPhos  75  06-16              PT/INR - ( 15 Maximilian 2023 14:33 )   PT: 14.4 sec;   INR: 1.20 ratio         PTT - ( 15 Maximilian 2023 14:33 )  PTT:33.6 sec  Urinalysis Basic - ( 15 Maximilian 2023 14:33 )    Color: Red / Appearance: very cloudy / S.015 / pH: x  Gluc: x / Ketone: Trace  / Bili: Negative / Urobili: Negative mg/dL   Blood: x / Protein: 500 mg/dL / Nitrite: Negative   Leuk Esterase: Moderate / RBC: >50 /HPF / WBC >50   Sq Epi: x / Non Sq Epi: x / Bacteria: Many      MEDICATIONS  (STANDING):  ascorbic acid 500 milliGRAM(s) Oral daily  cefTRIAXone   IVPB 1000 milliGRAM(s) IV Intermittent every 24 hours  enoxaparin Injectable 40 milliGRAM(s) SubCutaneous every 24 hours  folic acid 1 milliGRAM(s) Oral daily  lactated ringers. 1000 milliLiter(s) (50 mL/Hr) IV Continuous <Continuous>  multivitamin 1 Tablet(s) Oral daily  pantoprazole    Tablet 40 milliGRAM(s) Oral before breakfast  polyethylene glycol 3350 17 Gram(s) Oral daily    MEDICATIONS  (PRN):  acetaminophen     Tablet .. 650 milliGRAM(s) Oral every 6 hours PRN Temp greater or equal to 38C (100.4F), Mild Pain (1 - 3)  melatonin 3 milliGRAM(s) Oral at bedtime PRN Insomnia

## 2023-06-17 LAB
ANION GAP SERPL CALC-SCNC: 7 MMOL/L — SIGNIFICANT CHANGE UP (ref 5–17)
BUN SERPL-MCNC: 15 MG/DL — SIGNIFICANT CHANGE UP (ref 7–23)
CALCIUM SERPL-MCNC: 8.5 MG/DL — SIGNIFICANT CHANGE UP (ref 8.5–10.1)
CHLORIDE SERPL-SCNC: 102 MMOL/L — SIGNIFICANT CHANGE UP (ref 96–108)
CO2 SERPL-SCNC: 27 MMOL/L — SIGNIFICANT CHANGE UP (ref 22–31)
CREAT SERPL-MCNC: 0.8 MG/DL — SIGNIFICANT CHANGE UP (ref 0.5–1.3)
EGFR: 86 ML/MIN/1.73M2 — SIGNIFICANT CHANGE UP
GLUCOSE SERPL-MCNC: 113 MG/DL — HIGH (ref 70–99)
HCT VFR BLD CALC: 32.3 % — LOW (ref 39–50)
HCT VFR BLD CALC: 33.5 % — LOW (ref 39–50)
HGB BLD-MCNC: 10.4 G/DL — LOW (ref 13–17)
HGB BLD-MCNC: 11 G/DL — LOW (ref 13–17)
LACTATE SERPL-SCNC: 1 MMOL/L — SIGNIFICANT CHANGE UP (ref 0.7–2)
MCHC RBC-ENTMCNC: 26.5 PG — LOW (ref 27–34)
MCHC RBC-ENTMCNC: 26.7 PG — LOW (ref 27–34)
MCHC RBC-ENTMCNC: 32.2 G/DL — SIGNIFICANT CHANGE UP (ref 32–36)
MCHC RBC-ENTMCNC: 32.8 G/DL — SIGNIFICANT CHANGE UP (ref 32–36)
MCV RBC AUTO: 81.3 FL — SIGNIFICANT CHANGE UP (ref 80–100)
MCV RBC AUTO: 82.2 FL — SIGNIFICANT CHANGE UP (ref 80–100)
NRBC # BLD: 0 /100 WBCS — SIGNIFICANT CHANGE UP (ref 0–0)
NRBC # BLD: 0 /100 WBCS — SIGNIFICANT CHANGE UP (ref 0–0)
PLATELET # BLD AUTO: 242 K/UL — SIGNIFICANT CHANGE UP (ref 150–400)
PLATELET # BLD AUTO: 262 K/UL — SIGNIFICANT CHANGE UP (ref 150–400)
POTASSIUM SERPL-MCNC: 3.5 MMOL/L — SIGNIFICANT CHANGE UP (ref 3.5–5.3)
POTASSIUM SERPL-SCNC: 3.5 MMOL/L — SIGNIFICANT CHANGE UP (ref 3.5–5.3)
RBC # BLD: 3.93 M/UL — LOW (ref 4.2–5.8)
RBC # BLD: 4.12 M/UL — LOW (ref 4.2–5.8)
RBC # FLD: 14 % — SIGNIFICANT CHANGE UP (ref 10.3–14.5)
RBC # FLD: 14.2 % — SIGNIFICANT CHANGE UP (ref 10.3–14.5)
SODIUM SERPL-SCNC: 136 MMOL/L — SIGNIFICANT CHANGE UP (ref 135–145)
WBC # BLD: 7.99 K/UL — SIGNIFICANT CHANGE UP (ref 3.8–10.5)
WBC # BLD: 8.79 K/UL — SIGNIFICANT CHANGE UP (ref 3.8–10.5)
WBC # FLD AUTO: 7.99 K/UL — SIGNIFICANT CHANGE UP (ref 3.8–10.5)
WBC # FLD AUTO: 8.79 K/UL — SIGNIFICANT CHANGE UP (ref 3.8–10.5)

## 2023-06-17 PROCEDURE — 99232 SBSQ HOSP IP/OBS MODERATE 35: CPT

## 2023-06-17 RX ORDER — PIPERACILLIN AND TAZOBACTAM 4; .5 G/20ML; G/20ML
3.38 INJECTION, POWDER, LYOPHILIZED, FOR SOLUTION INTRAVENOUS EVERY 8 HOURS
Refills: 0 | Status: DISCONTINUED | OUTPATIENT
Start: 2023-06-18 | End: 2023-06-19

## 2023-06-17 RX ORDER — PIPERACILLIN AND TAZOBACTAM 4; .5 G/20ML; G/20ML
3.38 INJECTION, POWDER, LYOPHILIZED, FOR SOLUTION INTRAVENOUS ONCE
Refills: 0 | Status: COMPLETED | OUTPATIENT
Start: 2023-06-17 | End: 2023-06-18

## 2023-06-17 RX ORDER — PIPERACILLIN AND TAZOBACTAM 4; .5 G/20ML; G/20ML
3.38 INJECTION, POWDER, LYOPHILIZED, FOR SOLUTION INTRAVENOUS ONCE
Refills: 0 | Status: COMPLETED | OUTPATIENT
Start: 2023-06-17 | End: 2023-06-17

## 2023-06-17 RX ADMIN — Medication 650 MILLIGRAM(S): at 16:38

## 2023-06-17 RX ADMIN — Medication 650 MILLIGRAM(S): at 19:37

## 2023-06-17 RX ADMIN — ENOXAPARIN SODIUM 40 MILLIGRAM(S): 100 INJECTION SUBCUTANEOUS at 17:43

## 2023-06-17 RX ADMIN — PANTOPRAZOLE SODIUM 40 MILLIGRAM(S): 20 TABLET, DELAYED RELEASE ORAL at 06:06

## 2023-06-17 RX ADMIN — Medication 1 TABLET(S): at 11:28

## 2023-06-17 RX ADMIN — Medication 1 MILLIGRAM(S): at 11:28

## 2023-06-17 RX ADMIN — POLYETHYLENE GLYCOL 3350 17 GRAM(S): 17 POWDER, FOR SOLUTION ORAL at 11:27

## 2023-06-17 RX ADMIN — PIPERACILLIN AND TAZOBACTAM 200 GRAM(S): 4; .5 INJECTION, POWDER, LYOPHILIZED, FOR SOLUTION INTRAVENOUS at 21:18

## 2023-06-17 RX ADMIN — Medication 500 MILLIGRAM(S): at 11:28

## 2023-06-17 RX ADMIN — CEFTRIAXONE 100 MILLIGRAM(S): 500 INJECTION, POWDER, FOR SOLUTION INTRAMUSCULAR; INTRAVENOUS at 17:10

## 2023-06-17 NOTE — PROGRESS NOTE ADULT - ASSESSMENT
89 yo M w/ PMHx of Dementia, Prostate Ca and [place] presents due to generalized weakness. Reports he feels so weak that he was having trouble to put on his shirt today. Pt has chronic coreas, it was replaced in his urologists office earlier today and found to have dark, malodorous urine. Pt denies headache, dizziness, sob, chest pain, nausea, vomiting, fever, chills, abd pain.      UTI #associated with Chronic coreas 2/2 to Prostate Ca  - UA: Many bacteria  - Ceftriaxone 1g x 7 days  - Coreas changes earlier today in patient's urology office. Follow urine cultures.     #Hyperkalemia  - improved.     Dementia. supportive care.     Anemia of chronic disease. HB better today. no hematuria reported.     Code: Full  VTE: Lovenox .   Diet: Regular

## 2023-06-17 NOTE — PROGRESS NOTE ADULT - SUBJECTIVE AND OBJECTIVE BOX
CHIEF COMPLAINT: Follow up of coreas associated UTI  No fever  no confusion at this time  no chest pain or sob reported   No hematuria      PHYSICAL EXAM:    GENERAL: Moderately built, no acute distress   CHEST/LUNG:  No wheezing, no crackles   HEART: Regular rate and rhythm; No murmurs  ABDOMEN: Soft, Nontender, Nondistended; Bowel sounds present. + coreas  EXTREMITIES:  No clubbing, cyanosis, or edema  NERVOUS SYSTEM:  Grossly non focal.  Psychiatry: AAO x name,  and location only. not to time and year      OBJECTIVE DATA:     Vital Signs Last 24 Hrs  T(C): 37.8 (2023 10:58), Max: 37.8 (2023 10:58)  T(F): 100.1 (2023 10:58), Max: 100.1 (2023 10:58)  HR: 84 (2023 10:58) (66 - 84)  BP: 152/77 (2023 10:58) (150/69 - 169/67)  BP(mean): --  RR: 18 (2023 10:58) (18 - 18)  SpO2: 96% (2023 10:58) (96% - 97%)    Parameters below as of 2023 10:58  Patient On (Oxygen Delivery Method): room air               Daily     Daily   LABS:                        10.4   7.99  )-----------( 242      ( 2023 05:40 )             32.3             06-17    136  |  102  |  15  ----------------------------<  113<H>  3.5   |  27  |  0.80    Ca    8.5      2023 05:40  Mg     2.1     06-15    TPro  6.5  /  Alb  2.5<L>  /  TBili  0.7  /  DBili  x   /  AST  13<L>  /  ALT  15  /  AlkPhos  75  06-16              PT/INR - ( 15 Maximilian 2023 14:33 )   PT: 14.4 sec;   INR: 1.20 ratio         PTT - ( 15 Maximilian 2023 14:33 )  PTT:33.6 sec  Urinalysis Basic - ( 15 Maximilian 2023 14:33 )    Color: Red / Appearance: very cloudy / S.015 / pH: x  Gluc: x / Ketone: Trace  / Bili: Negative / Urobili: Negative mg/dL   Blood: x / Protein: 500 mg/dL / Nitrite: Negative   Leuk Esterase: Moderate / RBC: >50 /HPF / WBC >50   Sq Epi: x / Non Sq Epi: x / Bacteria: Many      Culture - Urine (collected 06-15)  Source: Clean Catch Clean Catch (Midstream)  Preliminary Report ():    Culture in progress          Interval Radiology studies: reviewed by me    < from: Xray Chest 1 View- PORTABLE-Urgent (Xray Chest 1 View- PORTABLE-Urgent .) (06.15.23 @ 15:25) >    No acute pulmonary disease.    < end of copied text >    MEDICATIONS  (STANDING):  ascorbic acid 500 milliGRAM(s) Oral daily  cefTRIAXone   IVPB 1000 milliGRAM(s) IV Intermittent every 24 hours  enoxaparin Injectable 40 milliGRAM(s) SubCutaneous every 24 hours  folic acid 1 milliGRAM(s) Oral daily  lactated ringers. 1000 milliLiter(s) (50 mL/Hr) IV Continuous <Continuous>  multivitamin 1 Tablet(s) Oral daily  pantoprazole    Tablet 40 milliGRAM(s) Oral before breakfast  polyethylene glycol 3350 17 Gram(s) Oral daily    MEDICATIONS  (PRN):  acetaminophen     Tablet .. 650 milliGRAM(s) Oral every 6 hours PRN Temp greater or equal to 38C (100.4F), Mild Pain (1 - 3)  melatonin 3 milliGRAM(s) Oral at bedtime PRN Insomnia

## 2023-06-18 LAB
-  AMPICILLIN/SULBACTAM: SIGNIFICANT CHANGE UP
-  AMPICILLIN: SIGNIFICANT CHANGE UP
-  CEFAZOLIN: SIGNIFICANT CHANGE UP
-  CIPROFLOXACIN: SIGNIFICANT CHANGE UP
-  DAPTOMYCIN: SIGNIFICANT CHANGE UP
-  GENTAMICIN: SIGNIFICANT CHANGE UP
-  LEVOFLOXACIN: SIGNIFICANT CHANGE UP
-  LINEZOLID: SIGNIFICANT CHANGE UP
-  NITROFURANTOIN: SIGNIFICANT CHANGE UP
-  OXACILLIN: SIGNIFICANT CHANGE UP
-  PENICILLIN: SIGNIFICANT CHANGE UP
-  RIFAMPIN: SIGNIFICANT CHANGE UP
-  TETRACYCLINE: SIGNIFICANT CHANGE UP
-  TETRACYCLINE: SIGNIFICANT CHANGE UP
-  TRIMETHOPRIM/SULFAMETHOXAZOLE: SIGNIFICANT CHANGE UP
-  VANCOMYCIN: SIGNIFICANT CHANGE UP
-  VANCOMYCIN: SIGNIFICANT CHANGE UP
CULTURE RESULTS: SIGNIFICANT CHANGE UP
METHOD TYPE: SIGNIFICANT CHANGE UP
METHOD TYPE: SIGNIFICANT CHANGE UP
ORGANISM # SPEC MICROSCOPIC CNT: SIGNIFICANT CHANGE UP
RAPID RVP RESULT: SIGNIFICANT CHANGE UP
SARS-COV-2 RNA SPEC QL NAA+PROBE: SIGNIFICANT CHANGE UP
SPECIMEN SOURCE: SIGNIFICANT CHANGE UP

## 2023-06-18 PROCEDURE — 71250 CT THORAX DX C-: CPT | Mod: 26

## 2023-06-18 PROCEDURE — 74176 CT ABD & PELVIS W/O CONTRAST: CPT | Mod: 26

## 2023-06-18 PROCEDURE — 99233 SBSQ HOSP IP/OBS HIGH 50: CPT

## 2023-06-18 RX ORDER — VANCOMYCIN HCL 1 G
1000 VIAL (EA) INTRAVENOUS EVERY 12 HOURS
Refills: 0 | Status: COMPLETED | OUTPATIENT
Start: 2023-06-19 | End: 2023-06-21

## 2023-06-18 RX ORDER — VANCOMYCIN HCL 1 G
1000 VIAL (EA) INTRAVENOUS ONCE
Refills: 0 | Status: COMPLETED | OUTPATIENT
Start: 2023-06-18 | End: 2023-06-18

## 2023-06-18 RX ORDER — VANCOMYCIN HCL 1 G
VIAL (EA) INTRAVENOUS
Refills: 0 | Status: COMPLETED | OUTPATIENT
Start: 2023-06-18 | End: 2023-06-21

## 2023-06-18 RX ADMIN — PIPERACILLIN AND TAZOBACTAM 25 GRAM(S): 4; .5 INJECTION, POWDER, LYOPHILIZED, FOR SOLUTION INTRAVENOUS at 02:09

## 2023-06-18 RX ADMIN — Medication 250 MILLIGRAM(S): at 20:37

## 2023-06-18 RX ADMIN — PIPERACILLIN AND TAZOBACTAM 25 GRAM(S): 4; .5 INJECTION, POWDER, LYOPHILIZED, FOR SOLUTION INTRAVENOUS at 10:58

## 2023-06-18 RX ADMIN — Medication 650 MILLIGRAM(S): at 15:00

## 2023-06-18 RX ADMIN — PIPERACILLIN AND TAZOBACTAM 25 GRAM(S): 4; .5 INJECTION, POWDER, LYOPHILIZED, FOR SOLUTION INTRAVENOUS at 18:03

## 2023-06-18 RX ADMIN — Medication 650 MILLIGRAM(S): at 14:28

## 2023-06-18 RX ADMIN — Medication 500 MILLIGRAM(S): at 12:34

## 2023-06-18 RX ADMIN — PANTOPRAZOLE SODIUM 40 MILLIGRAM(S): 20 TABLET, DELAYED RELEASE ORAL at 06:18

## 2023-06-18 RX ADMIN — Medication 1 TABLET(S): at 12:34

## 2023-06-18 RX ADMIN — ENOXAPARIN SODIUM 40 MILLIGRAM(S): 100 INJECTION SUBCUTANEOUS at 18:02

## 2023-06-18 RX ADMIN — Medication 1 MILLIGRAM(S): at 12:34

## 2023-06-18 RX ADMIN — POLYETHYLENE GLYCOL 3350 17 GRAM(S): 17 POWDER, FOR SOLUTION ORAL at 12:34

## 2023-06-18 NOTE — PROGRESS NOTE ADULT - ASSESSMENT
89 yo M w/ PMHx of Dementia, Prostate Ca and [place] presents due to generalized weakness. Reports he feels so weak that he was having trouble to put on his shirt today. Pt has chronic coreas, it was replaced in his urologists office earlier today and found to have dark, malodorous urine. Pt denies headache, dizziness, sob, chest pain, nausea, vomiting, fever, chills, abd pain.      Staph aureus and Enterococcus faecalis UTI #associated with Chronic coreas 2/2 to Prostate Ca  + fever spikes.   - Cont zosyn for now. Follow final urine and blood cultures. ID consult and recs. cont gentle hydration.   - Coreas changes earlier today in patient's urology office. CT C/A/P SHOWED enlarged prostate    #Hyperkalemia  - improved.     Dementia. supportive care.     Anemia of chronic disease. HB better today. no hematuria reported.     Code: Full  VTE: Lovenox .   Diet: Regular    Time spent managing the patient is 36 mins.  89 yo M w/ PMHx of Dementia, Prostate Ca and [place] presents due to generalized weakness. Reports he feels so weak that he was having trouble to put on his shirt today. Pt has chronic coreas, it was replaced in his urologists office earlier today and found to have dark, malodorous urine. Pt denies headache, dizziness, sob, chest pain, nausea, vomiting, fever, chills, abd pain.      Staph aureus and Enterococcus faecalis UTI #associated with Chronic coreas 2/2 to Prostate Ca  + fever spikes.   - Cont zosyn for now. Follow final urine and blood cultures. ID consult and recs. cont gentle hydration.   - Coreas changes earlier today in patient's urology office. CT C/A/P SHOWED enlarged prostate    #Hyperkalemia  - improved.     Dementia. supportive care.     Anemia of chronic disease. HB better today. no hematuria reported.     Code: Full  VTE: Lovenox .   Diet: Regular    Time spent managing the patient is 56 mins.

## 2023-06-18 NOTE — PROGRESS NOTE ADULT - SUBJECTIVE AND OBJECTIVE BOX
CHIEF COMPLAINT: Follow up of coreas associated UTI  Fever 102 last evening.   no confusion at this time  no chest pain or sob reported   No hematuria      PHYSICAL EXAM:    GENERAL: Moderately built, no acute distress   CHEST/LUNG:  No wheezing, no crackles   HEART: Regular rate and rhythm; No murmurs  ABDOMEN: Soft, Nontender, Nondistended; Bowel sounds present. + coreas  EXTREMITIES:  No clubbing, cyanosis, or edema  NERVOUS SYSTEM:  Grossly non focal.  Psychiatry: AAO x name,  and location only. not to time and year      OBJECTIVE DATA:       Vital Signs Last 24 Hrs  T(C): 36.8 (2023 12:35), Max: 39.1 (2023 16:54)  T(F): 98.3 (2023 12:35), Max: 102.3 (2023 16:54)  HR: 80 (2023 12:35) (71 - 85)  BP: 157/76 (2023 12:35) (137/78 - 170/68)  BP(mean): --  RR: 18 (2023 12:35) (18 - 18)  SpO2: 98% (2023 12:35) (94% - 98%)    Parameters below as of 2023 12:35  Patient On (Oxygen Delivery Method): room air               Daily     Daily   LABS:                        11.0   8.79  )-----------( 262      ( 2023 19:30 )             33.5                 136  |  102  |  15  ----------------------------<  113<H>  3.5   |  27  |  0.80    Ca    8.5      2023 05:40                           Culture - Urine (collected 06-15)  Source: Clean Catch Clean Catch (Midstream)  Preliminary Report ():    >100,000 CFU/ml Staphylococcus aureus    10,000 - 49,000 CFU/mL Enterococcus faecalis    < from: CT Abdomen and Pelvis No Cont (23 @ 10:07) >  IMPRESSION: No consolidation in the lung. No acute abnormality in the   abdomen.    Enlarged prostate.    < end of copied text >       MEDICATIONS  (STANDING):  ascorbic acid 500 milliGRAM(s) Oral daily  enoxaparin Injectable 40 milliGRAM(s) SubCutaneous every 24 hours  folic acid 1 milliGRAM(s) Oral daily  lactated ringers. 1000 milliLiter(s) (50 mL/Hr) IV Continuous <Continuous>  multivitamin 1 Tablet(s) Oral daily  pantoprazole    Tablet 40 milliGRAM(s) Oral before breakfast  piperacillin/tazobactam IVPB.. 3.375 Gram(s) IV Intermittent every 8 hours  polyethylene glycol 3350 17 Gram(s) Oral daily    MEDICATIONS  (PRN):  acetaminophen     Tablet .. 650 milliGRAM(s) Oral every 6 hours PRN Temp greater or equal to 38C (100.4F), Mild Pain (1 - 3)  melatonin 3 milliGRAM(s) Oral at bedtime PRN Insomnia

## 2023-06-18 NOTE — PHYSICAL THERAPY INITIAL EVALUATION ADULT - FOLLOWS COMMANDS/ANSWERS QUESTIONS, REHAB EVAL
understand instructions but with difficulty in execution due to generalized weakness UE and LE, easy fatigue, multiple body aches and pains in the extremities

## 2023-06-18 NOTE — PHYSICAL THERAPY INITIAL EVALUATION ADULT - COORDINATION ASSESSED, REHAB EVAL
impaired due to strength deficits and body aches and pains in the UE and LE/finger to nose/heel to shin

## 2023-06-18 NOTE — PHYSICAL THERAPY INITIAL EVALUATION ADULT - STRENGTHENING, PT EVAL
Improve strength in UE and LE to 4+/5 if possible and general endurance to fair + or good if possible.

## 2023-06-18 NOTE — PHYSICAL THERAPY INITIAL EVALUATION ADULT - LIVES WITH, PROFILE
Pt's wife states that they live in a pvt house together, has ramp at the entrance and stair lift inside the house.

## 2023-06-18 NOTE — PHYSICAL THERAPY INITIAL EVALUATION ADULT - BALANCE TRAINING, PT EVAL
Improve sitting , standing and ambulation balance to good with use of assistive device with min A of 1 if possible,

## 2023-06-18 NOTE — PHYSICAL THERAPY INITIAL EVALUATION ADULT - PATIENT/FAMILY/SIGNIFICANT OTHER GOALS STATEMENT, PT EVAL
Pt's wife would like to see her  bet better, stronger and be able to ambulate again for short distances around the house with supervision./set up using assistive device.

## 2023-06-18 NOTE — PHYSICAL THERAPY INITIAL EVALUATION ADULT - PERTINENT HX OF CURRENT PROBLEM, REHAB EVAL
Pt's wife states the patient is admitted with c/o few days ago due to increasing weakness that started few days ago, he is usually able to ambulate for short distances around the house with use of rolling walker, Pmhx; Prostate CA, wife states he is supposed to have radiation treatment.

## 2023-06-18 NOTE — PHYSICAL THERAPY INITIAL EVALUATION ADULT - GENERAL OBSERVATIONS, REHAB EVAL
Pt found awake, on room air, not in distress at rest, c/o generalized weakness and aches and pains in the UE and LE when moving, palpation and doing functional tasks.

## 2023-06-18 NOTE — PHYSICAL THERAPY INITIAL EVALUATION ADULT - PRECAUTIONS/LIMITATIONS, REHAB EVAL
Pt presents with generalized weakness, difficulty in bed mobility, transfers and ambulation , fall prevention.

## 2023-06-18 NOTE — PHYSICAL THERAPY INITIAL EVALUATION ADULT - SOCIAL CONCERNS
Pt's wife is concerned that is going to be harder to assist her  at home by herself since he is getting weakness and with more difficulty doing functional tasks especially ambulating

## 2023-06-18 NOTE — PHYSICAL THERAPY INITIAL EVALUATION ADULT - ADDITIONAL COMMENTS
As per wife prior to this admission days ago her  was able to perform tasks - bed mobility , tranfers and ambulated for short distances around the house using rolling walker with

## 2023-06-19 LAB
ANION GAP SERPL CALC-SCNC: 5 MMOL/L — SIGNIFICANT CHANGE UP (ref 5–17)
BUN SERPL-MCNC: 24 MG/DL — HIGH (ref 7–23)
CALCIUM SERPL-MCNC: 8.4 MG/DL — LOW (ref 8.5–10.1)
CHLORIDE SERPL-SCNC: 99 MMOL/L — SIGNIFICANT CHANGE UP (ref 96–108)
CO2 SERPL-SCNC: 26 MMOL/L — SIGNIFICANT CHANGE UP (ref 22–31)
CREAT SERPL-MCNC: 0.88 MG/DL — SIGNIFICANT CHANGE UP (ref 0.5–1.3)
EGFR: 84 ML/MIN/1.73M2 — SIGNIFICANT CHANGE UP
GLUCOSE SERPL-MCNC: 161 MG/DL — HIGH (ref 70–99)
HCT VFR BLD CALC: 32.3 % — LOW (ref 39–50)
HGB BLD-MCNC: 10.7 G/DL — LOW (ref 13–17)
MCHC RBC-ENTMCNC: 26.8 PG — LOW (ref 27–34)
MCHC RBC-ENTMCNC: 33.1 G/DL — SIGNIFICANT CHANGE UP (ref 32–36)
MCV RBC AUTO: 80.8 FL — SIGNIFICANT CHANGE UP (ref 80–100)
NRBC # BLD: 0 /100 WBCS — SIGNIFICANT CHANGE UP (ref 0–0)
PLATELET # BLD AUTO: 256 K/UL — SIGNIFICANT CHANGE UP (ref 150–400)
POTASSIUM SERPL-MCNC: 3.3 MMOL/L — LOW (ref 3.5–5.3)
POTASSIUM SERPL-SCNC: 3.3 MMOL/L — LOW (ref 3.5–5.3)
RBC # BLD: 4 M/UL — LOW (ref 4.2–5.8)
RBC # FLD: 14.1 % — SIGNIFICANT CHANGE UP (ref 10.3–14.5)
SODIUM SERPL-SCNC: 130 MMOL/L — LOW (ref 135–145)
WBC # BLD: 7.31 K/UL — SIGNIFICANT CHANGE UP (ref 3.8–10.5)
WBC # FLD AUTO: 7.31 K/UL — SIGNIFICANT CHANGE UP (ref 3.8–10.5)

## 2023-06-19 PROCEDURE — 99223 1ST HOSP IP/OBS HIGH 75: CPT

## 2023-06-19 PROCEDURE — 99233 SBSQ HOSP IP/OBS HIGH 50: CPT

## 2023-06-19 RX ORDER — POTASSIUM CHLORIDE 20 MEQ
20 PACKET (EA) ORAL
Refills: 0 | Status: COMPLETED | OUTPATIENT
Start: 2023-06-19 | End: 2023-06-19

## 2023-06-19 RX ADMIN — Medication 3 MILLIGRAM(S): at 00:06

## 2023-06-19 RX ADMIN — PIPERACILLIN AND TAZOBACTAM 25 GRAM(S): 4; .5 INJECTION, POWDER, LYOPHILIZED, FOR SOLUTION INTRAVENOUS at 02:11

## 2023-06-19 RX ADMIN — PIPERACILLIN AND TAZOBACTAM 25 GRAM(S): 4; .5 INJECTION, POWDER, LYOPHILIZED, FOR SOLUTION INTRAVENOUS at 10:01

## 2023-06-19 RX ADMIN — Medication 250 MILLIGRAM(S): at 18:35

## 2023-06-19 RX ADMIN — Medication 650 MILLIGRAM(S): at 16:19

## 2023-06-19 RX ADMIN — Medication 20 MILLIEQUIVALENT(S): at 11:33

## 2023-06-19 RX ADMIN — PANTOPRAZOLE SODIUM 40 MILLIGRAM(S): 20 TABLET, DELAYED RELEASE ORAL at 06:04

## 2023-06-19 RX ADMIN — Medication 250 MILLIGRAM(S): at 06:04

## 2023-06-19 RX ADMIN — Medication 650 MILLIGRAM(S): at 17:19

## 2023-06-19 RX ADMIN — Medication 1 TABLET(S): at 11:31

## 2023-06-19 RX ADMIN — Medication 20 MILLIEQUIVALENT(S): at 10:01

## 2023-06-19 RX ADMIN — POLYETHYLENE GLYCOL 3350 17 GRAM(S): 17 POWDER, FOR SOLUTION ORAL at 11:30

## 2023-06-19 RX ADMIN — ENOXAPARIN SODIUM 40 MILLIGRAM(S): 100 INJECTION SUBCUTANEOUS at 18:38

## 2023-06-19 RX ADMIN — Medication 1 MILLIGRAM(S): at 11:31

## 2023-06-19 RX ADMIN — Medication 500 MILLIGRAM(S): at 11:31

## 2023-06-19 NOTE — CONSULT NOTE ADULT - SUBJECTIVE AND OBJECTIVE BOX
Full note to follow  Possible CAUTI POA  MRSA/E faecalis in urine  Inflammatory polyarthritis likely cause of patient's pain- B wrists, B MCP#1, L MCP #2, R knee, R MTP #1.  Doubt infectious etiology to polyarthritis  Stop Zosyn  Cont vanco for now  Rx vs g  Patient's wife asking about urology eval for Lupron shot  Thank you for the courtesy of this referral.  Emerson Humphrey MD  Attending Physician  [facility]  Division of Infectious Diseases  [phone]  =========  [facility] of Infectious Diseases  [phone]    TARA HARMAN  88y, Male  [id number]    HPI--      PMH/PSH--  Hearing impairment    Hypertension    Prostate CA    History of appendectomy    History of tonsillectomy        Allergies--  No Known Allergies      Medications--  Antibiotics: vancomycin  IVPB      vancomycin  IVPB 1000 milliGRAM(s) IV Intermittent every 12 hours    Immunologic:   Other: acetaminophen     Tablet .. PRN  ascorbic acid  enoxaparin Injectable  folic acid  lactated ringers.  melatonin PRN  multivitamin  pantoprazole    Tablet  polyethylene glycol 3350    Antimicrobials last 90 days per EMR: MEDICATIONS  (STANDING):  cefTRIAXone   IVPB   100 mL/Hr IV Intermittent (06-17-23 @ 17:10)   100 mL/Hr IV Intermittent (06-16-23 @ 17:51)    cefTRIAXone   IVPB   100 mL/Hr IV Intermittent (06-15-23 @ 17:53)    piperacillin/tazobactam IVPB.   200 mL/Hr IV Intermittent (06-17-23 @ 21:18)    piperacillin/tazobactam IVPB.-   25 mL/Hr IV Intermittent (06-18-23 @ 02:09)    piperacillin/tazobactam IVPB..   25 mL/Hr IV Intermittent (06-19-23 @ 10:01)   25 mL/Hr IV Intermittent (06-19-23 @ 02:11)   25 mL/Hr IV Intermittent (06-18-23 @ 18:03)   25 mL/Hr IV Intermittent (06-18-23 @ 10:58)    vancomycin  IVPB   250 mL/Hr IV Intermittent (06-18-23 @ 20:37)    vancomycin  IVPB   250 mL/Hr IV Intermittent (06-19-23 @ 06:04)        Social History--  EtOH: denies   Tobacco: denies   Drug Use: denies     Family/Marital History--    No sick contacts  Adult children and grandchildren  Family history of prostate cancer in father (Father)          Travel/Environmental/Occupational History:  Retired , owned a blueprint printing company in [place]    Review of Systems:  A >=10-point review of systems was obtained.     Pertinent positives and negatives--  Constitutional: No fevers. No Chills. No Rigors.   Eyes:  ENMT:  Cardiovascular: No chest pain. No palpitations.  Respiratory: No shortness of breath. No cough.  Gastrointestinal: No nausea or vomiting. No diarrhea or constipation.   Genitourinary:  Musculoskeletal:  Skin:  Neurologic:  Psychiatric: Pleasant. Appropriate affect.  Endocrine:  Heme/Lymphatic:  Allergy/Immunologic:    Review of systems otherwise negative except as previously noted.    Physical Exam--  Vital Signs: T(F): 98 (06-19-23 @ 11:18), Max: 99.1 (06-18-23 @ 23:39)  HR: 86 (06-19-23 @ 11:18)  BP: 126/74 (06-19-23 @ 11:18)  RR: 18 (06-19-23 @ 11:18)  SpO2: 98% (06-19-23 @ 11:18)  Wt(kg): --  General: Nontoxic-appearing Male in no acute distress.  HEENT: AT/NC. PERRL. EOMI. Anicteric. Conjunctiva pink and moist. Oropharynx clear. Dentition fair.  Neck: Not rigid. No sense of mass.  Nodes: None palpable.  Lungs: Clear bilaterally without rales, wheezing or rhonchi  Heart: Regular rate and rhythm. No Murmur. No rub. No gallop. No palpable thrill.  Abdomen: Bowel sounds present and normoactive. Soft. Nondistended. Nontender. No sense of mass. No organomegaly.  Back: No spinal tenderness. No costovertebral angle tenderness.   Extremities: No cyanosis or clubbing. No edema.   Skin: Warm. Dry. Good turgor. No rash. No vasculitic stigmata.  Psychiatric: Appropriate affect and mood for situation.         Laboratory & Imaging Data--  CBC                        10.7   7.31  )-----------( 256      ( 19 Jun 2023 06:50 )             32.3       Chemistries  06-19    130<L>  |  99  |  24<H>  ----------------------------<  161<H>  3.3<L>   |  26  |  0.88    Creatinine Trend  0.88 mg/dL (06-19-23 @ 06:50)  0.80 mg/dL (06-17-23 @ 05:40)  0.84 mg/dL (06-16-23 @ 06:50)  1.04 mg/dL (06-15-23 @ 14:33)      Ca    8.4<L>      19 Jun 2023 06:50        Culture Data    Culture - Blood (collected 17 Jun 2023 19:35)  Source: .Blood Blood  Preliminary Report (19 Jun 2023 01:02):    No growth to date.    Culture - Blood (collected 17 Jun 2023 19:30)  Source: .Blood Blood  Preliminary Report (19 Jun 2023 01:02):    No growth to date.    Culture - Urine (collected 15 Maximilian 2023 14:33)  Source: Clean Catch Clean Catch (Midstream)  Final Report (18 Jun 2023 17:21):    >100,000 CFU/ml Methicillin Resistant Staphylococcus aureus    10,000 - 49,000 CFU/mL Enterococcus faecalis  Organism: Methicillin resistant Staphylococcus aureus  Enterococcus faecalis (18 Jun 2023 17:21)  Organism: Enterococcus faecalis (18 Jun 2023 17:21)  Organism: Methicillin resistant Staphylococcus aureus (18 Jun 2023 17:21)         Full note to follow  Possible CAUTI POA  MRSA/E faecalis in urine  Inflammatory polyarthritis likely cause of patient's pain- B wrists, B MCP#1, L MCP #2, R knee, R MTP #1.  Doubt infectious etiology to polyarthritis  Stop Zosyn  Cont vanco for now  Rx vs ?gout as per medicine  Patient's wife asking about urology eval for Lupron shot  Thank you for the courtesy of this referral.  Emerson Humphrey MD  Attending Physician  [facility]  Division of Infectious Diseases  [phone]  =========  [facility] of Infectious Diseases  [phone]    TARA HARMAN  88y, Male  [id number]    HPI--  Patient is a nonhistorian- some cognitive impairment, and [place]. Hx from chart and wife at bedside. As per HPI:  89 yo M w/ PMHx of Dementia, Prostate Ca and [place] presents due to generalized weakness. Reports he feels so weak that he was having trouble to put on his shirt today. Pt has chronic coreas, it was replaced in his urologists office earlier today and found to have dark, malodorous urine. Pt denies headache, dizziness, sob, chest pain, nausea, vomiting, fever, chills, abd pain.  Pt alert and oriented x1, poor historian due to memory loss. Unsure of baseline mental status (15 Maximilian 2023 18:04)    Patient has increasing weakness, much out of proportion to his baseline–although the latter is not great per the wife.  He was sent in by the urology team.  Here he was given broad-spectrum antibiotics but had persistent fever and is now known to have both MRSA and Enterococcus in the urine.  Vancomycin was added to the regimen and temperature curve has perhaps moderated. Patient's wife states that he has been complaining of pain "all over."  He has pain whenever someone tries to move him or position him.  This is not his baseline. The patient's wife also has concerns about him getting a Lupron injection.  He was scheduled to see urology shortly.  The patient is also slated to begin radiation therapy.      PMH/PSH--  Hearing impairment    Hypertension    Prostate CA    History of appendectomy    History of tonsillectomy        Allergies--  No Known Allergies      Medications--  Antibiotics: vancomycin  IVPB      vancomycin  IVPB 1000 milliGRAM(s) IV Intermittent every 12 hours    Immunologic:   Other: acetaminophen     Tablet .. PRN  ascorbic acid  enoxaparin Injectable  folic acid  lactated ringers.  melatonin PRN  multivitamin  pantoprazole    Tablet  polyethylene glycol 3350    Antimicrobials last 90 days per EMR: MEDICATIONS  (STANDING):  cefTRIAXone   IVPB   100 mL/Hr IV Intermittent (06-17-23 @ 17:10)   100 mL/Hr IV Intermittent (06-16-23 @ 17:51)    cefTRIAXone   IVPB   100 mL/Hr IV Intermittent (06-15-23 @ 17:53)    piperacillin/tazobactam IVPB.   200 mL/Hr IV Intermittent (06-17-23 @ 21:18)    piperacillin/tazobactam IVPB.-   25 mL/Hr IV Intermittent (06-18-23 @ 02:09)    piperacillin/tazobactam IVPB..   25 mL/Hr IV Intermittent (06-19-23 @ 10:01)   25 mL/Hr IV Intermittent (06-19-23 @ 02:11)   25 mL/Hr IV Intermittent (06-18-23 @ 18:03)   25 mL/Hr IV Intermittent (06-18-23 @ 10:58)    vancomycin  IVPB   250 mL/Hr IV Intermittent (06-18-23 @ 20:37)    vancomycin  IVPB   250 mL/Hr IV Intermittent (06-19-23 @ 06:04)      Social History--  EtOH: denies active  Tobacco: denies active  Drug Use: denies active    Family/Marital History--    No sick contacts  Adult children and grandchildren  Family history of prostate cancer in father (Father)          Travel/Environmental/Occupational History:  Retired , owned a blueprint printing company in [place]    Review of Systems:  Review of systems unable secondary to clinical condition.     Physical Exam--  Vital Signs: T(F): 98 (06-19-23 @ 11:18), Max: 99.1 (06-18-23 @ 23:39)  HR: 86 (06-19-23 @ 11:18)  BP: 126/74 (06-19-23 @ 11:18)  RR: 18 (06-19-23 @ 11:18)  SpO2: 98% (06-19-23 @ 11:18)  Wt(kg): --  General: Fail-appearing Male in no acute distress. [place]  HEENT: AT/NC. PERRL. EOMI. Anicteric. Conjunctiva pink and moist. Oropharynx clear.   Neck: Not rigid. No sense of mass.  Nodes: None palpable.  Lungs: Grossly clear bilaterally   Heart: Regular rate and rhythm.   Abdomen: Bowel sounds present and normoactive. Soft. Nondistended. Nontender.  Back: No spinal tenderness. No costovertebral angle tenderness.   : Coreas-> yellow urine  Extremities: No cyanosis or clubbing. No edema.   MSK: Patient has redness and warmth of the wrist bilaterally, first MCP joints bilaterally, with exquisite tenderness with any motion.  There is also pain and redness, though not as impressive, in the left second MCP joint there is no edema, in contrast to the above.  There is an effusion and exquisite range of motion tenderness in the right knee.  The left knee is unaffected.  There is also warmth and edema of the right first MTP joint.  Other joints do not appear to be acutely inflamed.  Skin: Warm. Dry. Good turgor. No rash. No vasculitic stigmata.  Psychiatric: Grossly appropriate affect and mood for situation.       Laboratory & Imaging Data--  CBC                        10.7   7.31  )-----------( 256      ( 19 Jun 2023 06:50 )             32.3       Chemistries  06-19    130<L>  |  99  |  24<H>  ----------------------------<  161<H>  3.3<L>   |  26  |  0.88    Creatinine Trend  0.88 mg/dL (06-19-23 @ 06:50)  0.80 mg/dL (06-17-23 @ 05:40)  0.84 mg/dL (06-16-23 @ 06:50)  1.04 mg/dL (06-15-23 @ 14:33)      Ca    8.4<L>      19 Jun 2023 06:50        Culture Data    Culture - Blood (collected 17 Jun 2023 19:35)  Source: .Blood Blood  Preliminary Report (19 Jun 2023 01:02):    No growth to date.    Culture - Blood (collected 17 Jun 2023 19:30)  Source: .Blood Blood  Preliminary Report (19 Jun 2023 01:02):    No growth to date.    Culture - Urine (collected 15 Maximilian 2023 14:33)  Source: Clean Catch Clean Catch (Midstream)  Final Report (18 Jun 2023 17:21):    >100,000 CFU/ml Methicillin Resistant Staphylococcus aureus    10,000 - 49,000 CFU/mL Enterococcus faecalis  Organism: Methicillin resistant Staphylococcus aureus  Enterococcus faecalis (18 Jun 2023 17:21)  Organism: Enterococcus faecalis (18 Jun 2023 17:21)  Organism: Methicillin resistant Staphylococcus aureus (18 Jun 2023 17:21)

## 2023-06-19 NOTE — PHARMACOTHERAPY INTERVENTION NOTE - COMMENTS
Recommended to discontinue Zosyn 3.375 g IV every 8 hours for treatment of a UTI as patient was started on vancomycin 750 mG IV every 12 hours which is able to cover all organisms isolated in the cultures.  Recommended to discontinue Zosyn 3.375 g IV every 8 hours for treatment of a UTI as patient was started on vancomycin 1 g IV every 12 hours which is able to cover all organisms isolated in the cultures.

## 2023-06-19 NOTE — CONSULT NOTE ADULT - ASSESSMENT
Possible CAUTI POA  MRSA/E faecalis in urine  Inflammatory polyarthritis likely cause of patient's pain- B wrists, B MCP#1, L MCP #2, R knee, R MTP #1.  Doubt infectious etiology to polyarthritis  Stop Zosyn  Cont vanco for now  Rx vs ?gout as per medicine  Patient's wife asking about urology eval for Lupron shot  Thank you for the courtesy of this referral.  Emerson Humphrey MD  Attending Physician  [facility]  Division of Infectious Diseases  [phone]

## 2023-06-19 NOTE — PROGRESS NOTE ADULT - SUBJECTIVE AND OBJECTIVE BOX
CHIEF COMPLAINT: Follow up of Coreas associated UTI  No fever.   no vomiting   no chest pain or sob reported   No hematuria      PHYSICAL EXAM:    GENERAL: Moderately built, no acute distress   CHEST/LUNG:  No wheezing, no crackles   HEART: Regular rate and rhythm; No murmurs  ABDOMEN: Soft, Nontender, Nondistended; Bowel sounds present. + coreas  EXTREMITIES:  No clubbing, cyanosis, or edema  Psychiatry: AAO x name,  and location only. Not to time and year      OBJECTIVE DATA:     Vital Signs Last 24 Hrs  T(C): 36.6 (2023 05:08), Max: 37.3 (2023 23:39)  T(F): 97.9 (2023 05:08), Max: 99.1 (2023 23:39)  HR: 71 (2023 05:08) (71 - 84)  BP: 127/68 (2023 05:08) (126/67 - 157/76)  BP(mean): --  RR: 18 (2023 05:08) (17 - 18)  SpO2: 94% (2023 05:08) (94% - 98%)    Parameters below as of 2023 05:08  Patient On (Oxygen Delivery Method): room air               Daily     Daily   LABS:                        10.7   7.31  )-----------( 256      ( 2023 06:50 )             32.3                 130<L>  |  99  |  24<H>  ----------------------------<  161<H>  3.3<L>   |  26  |  0.88    Ca    8.4<L>      2023 06:50                         CAPILLARY BLOOD GLUCOSE          Culture - Blood (collected )  Source: .Blood Blood  Preliminary Report ():    No growth to date.    Culture - Blood (collected )  Source: .Blood Blood  Preliminary Report ():    No growth to date.    Culture - Urine (collected 06-15)  Source: Clean Catch Clean Catch (Midstream)  Final Report ():    >100,000 CFU/ml Methicillin Resistant Staphylococcus aureus    10,000 - 49,000 CFU/mL Enterococcus faecalis  Organism: Methicillin resistant Staphylococcus aureus  Enterococcus faecalis ()  Organism: Enterococcus faecalis ()    Sensitivities:      Method Type: NATHANIEL      -  Ampicillin: S <=2 Predicts results to ampicillin/sulbactam, amoxacillin-clavulanate and  piperacillin-tazobactam.      -  Ciprofloxacin: R >2      -  Levofloxacin: R >4      -  Nitrofurantoin: S <=32 Should not be used to treat pyelonephritis.      -  Tetracycline: R >8      -  Vancomycin: S 1  Organism: Methicillin resistant Staphylococcus aureus ()    Sensitivities:      Method Type: NATHANIEL      -  Ampicillin/Sulbactam: R <=8/4      -  Cefazolin: R <=4      -  Daptomycin: S 0.5      -  Gentamicin: S <=1 Should not be used as monotherapy      -  Linezolid: S 2      -  Oxacillin: R >2      -  Penicillin: R >8      -  Rifampin: S <=1 Should not be used as monotherapy      -  Tetracycline: I 8      -  Trimethoprim/Sulfamethoxazole: S <=0.5/9.5      -  Vancomycin: S 1          Interval Radiology studies: reviewed by me    < from: CT Abdomen and Pelvis No Cont (23 @ 10:07) >    IMPRESSION: No consolidation in the lung. No acute abnormality in the   abdomen.    Enlarged prostate.    < end of copied text >    MEDICATIONS  (STANDING):  ascorbic acid 500 milliGRAM(s) Oral daily  enoxaparin Injectable 40 milliGRAM(s) SubCutaneous every 24 hours  folic acid 1 milliGRAM(s) Oral daily  lactated ringers. 1000 milliLiter(s) (50 mL/Hr) IV Continuous <Continuous>  multivitamin 1 Tablet(s) Oral daily  pantoprazole    Tablet 40 milliGRAM(s) Oral before breakfast  piperacillin/tazobactam IVPB.. 3.375 Gram(s) IV Intermittent every 8 hours  polyethylene glycol 3350 17 Gram(s) Oral daily  potassium chloride    Tablet ER 20 milliEquivalent(s) Oral every 2 hours  vancomycin  IVPB      vancomycin  IVPB 1000 milliGRAM(s) IV Intermittent every 12 hours    MEDICATIONS  (PRN):  acetaminophen     Tablet .. 650 milliGRAM(s) Oral every 6 hours PRN Temp greater or equal to 38C (100.4F), Mild Pain (1 - 3)  melatonin 3 milliGRAM(s) Oral at bedtime PRN Insomnia

## 2023-06-19 NOTE — CHART NOTE - NSCHARTNOTEFT_GEN_A_CORE
Notified of final UCX by RN:    Culture - Urine (06.15.23 @ 14:33)    -  Ampicillin: S <=2 Predicts results to ampicillin/sulbactam, amoxacillin-clavulanate and  piperacillin-tazobactam.    -  Ampicillin/Sulbactam: R <=8/4    -  Cefazolin: R <=4    -  Ciprofloxacin: R >2    -  Daptomycin: S 0.5    -  Gentamicin: S <=1 Should not be used as monotherapy    -  Levofloxacin: R >4    -  Linezolid: S 2    -  Nitrofurantoin: S <=32 Should not be used to treat pyelonephritis.    -  Oxacillin: R >2    -  Penicillin: R >8    -  Rifampin: S <=1 Should not be used as monotherapy    -  Tetracycline: I 8    -  Tetracycline: R >8    -  Trimethoprim/Sulfamethoxazole: S <=0.5/9.5    -  Vancomycin: S 1    -  Vancomycin: S 1    Specimen Source: Clean Catch Clean Catch (Midstream)    Culture Results:   >100,000 CFU/ml Methicillin Resistant Staphylococcus aureus  10,000 - 49,000 CFU/mL Enterococcus faecalis    Organism Identification: Methicillin resistant Staphylococcus aureus  Enterococcus faecalis    Organism: Methicillin resistant Staphylococcus aureus    Organism: Enterococcus faecalis    Method Type: NATHANIEL    Method Type: NATHANIEL    eGFR: 86:  (06.17.23 @ 05:40)    Pt currently afebrile without active complaint.  - will start vancomycin 1g iv q12hr for goal trough 15 /  - check trough prior to 4th dose

## 2023-06-19 NOTE — PROGRESS NOTE ADULT - ASSESSMENT
89 yo M w/ PMHx of Dementia, Prostate Ca and [place] presents due to generalized weakness. Reports he feels so weak that he was having trouble to put on his shirt today. Pt has chronic coreas, it was replaced in his urologists office earlier today and found to have dark, malodorous urine. Pt denies headache, dizziness, sob, chest pain, nausea, vomiting, fever, chills, abd pain.      MRSA and Enterococcus faecalis UTI #associated with Chronic coreas 2/2 to Prostate Ca   - no more fever. started iv vancomycin and cont iv zosyn. Follow vancomycin trough (needs therapeutic drug monitoring). consulted and discussed with ID. Follow recs. cont IVF. follow labs.     #Hyperkalemia  - improved.     Dementia. supportive care.     Anemia of chronic disease. HB stable.     Code: Full  VTE: Lovenox .   Diet: Regular    Time spent managing the patient is 54 mins.  89 yo M w/ PMHx of Dementia, Prostate Ca and [place] presents due to generalized weakness. Reports he feels so weak that he was having trouble to put on his shirt today. Pt has chronic coreas, it was replaced in his urologists office earlier today and found to have dark, malodorous urine. Pt denies headache, dizziness, sob, chest pain, nausea, vomiting, fever, chills, abd pain.      MRSA and Enterococcus faecalis UTI #associated with Chronic coreas 2/2 to Prostate Ca   - no more fever. started iv vancomycin and cont iv zosyn. Follow vancomycin trough (needs therapeutic drug monitoring). consulted and discussed with ID. Follow recs. cont IVF. follow labs.     #Hyperkalemia  - improved.     Dementia. supportive care.     Anemia of chronic disease. HB stable.     Code: Full  VTE: Lovenox .   Diet: Regular    called wife to provide updates but no response. left message.   Time spent managing the patient is 54 mins.

## 2023-06-20 DIAGNOSIS — T83.511A INFECTION AND INFLAMMATORY REACTION DUE TO INDWELLING URETHRAL CATHETER, INITIAL ENCOUNTER: ICD-10-CM

## 2023-06-20 DIAGNOSIS — M13.0 POLYARTHRITIS, UNSPECIFIED: ICD-10-CM

## 2023-06-20 LAB
CULTURE RESULTS: SIGNIFICANT CHANGE UP
SPECIMEN SOURCE: SIGNIFICANT CHANGE UP
VANCOMYCIN TROUGH SERPL-MCNC: 10 UG/ML — SIGNIFICANT CHANGE UP (ref 10–20)

## 2023-06-20 PROCEDURE — 99233 SBSQ HOSP IP/OBS HIGH 50: CPT

## 2023-06-20 PROCEDURE — 99233 SBSQ HOSP IP/OBS HIGH 50: CPT | Mod: FS

## 2023-06-20 RX ORDER — IBUPROFEN 200 MG
400 TABLET ORAL
Refills: 0 | Status: COMPLETED | OUTPATIENT
Start: 2023-06-20 | End: 2023-06-23

## 2023-06-20 RX ADMIN — Medication 1 MILLIGRAM(S): at 13:04

## 2023-06-20 RX ADMIN — Medication 500 MILLIGRAM(S): at 13:04

## 2023-06-20 RX ADMIN — Medication 20 MILLIGRAM(S): at 13:04

## 2023-06-20 RX ADMIN — ENOXAPARIN SODIUM 40 MILLIGRAM(S): 100 INJECTION SUBCUTANEOUS at 17:27

## 2023-06-20 RX ADMIN — Medication 650 MILLIGRAM(S): at 13:06

## 2023-06-20 RX ADMIN — Medication 400 MILLIGRAM(S): at 17:26

## 2023-06-20 RX ADMIN — PANTOPRAZOLE SODIUM 40 MILLIGRAM(S): 20 TABLET, DELAYED RELEASE ORAL at 08:04

## 2023-06-20 RX ADMIN — Medication 250 MILLIGRAM(S): at 17:27

## 2023-06-20 RX ADMIN — Medication 1 TABLET(S): at 13:04

## 2023-06-20 RX ADMIN — Medication 400 MILLIGRAM(S): at 18:26

## 2023-06-20 RX ADMIN — Medication 250 MILLIGRAM(S): at 05:54

## 2023-06-20 RX ADMIN — POLYETHYLENE GLYCOL 3350 17 GRAM(S): 17 POWDER, FOR SOLUTION ORAL at 13:04

## 2023-06-20 RX ADMIN — Medication 650 MILLIGRAM(S): at 14:06

## 2023-06-20 NOTE — PROGRESS NOTE ADULT - SUBJECTIVE AND OBJECTIVE BOX
TARA HARMAN  MRN-[id number]    Follow Up:  uti, inflammatory polyarthritis     Interval History: the pt was seen and examined earlier, no acute distress, out of bed to chair, [place], pt's wife is present. Pt is slouching to the right in his chair, does not look very comfortable, asking to go back to bed, RN notified and will help. Pt is afebrile since 6/17, RA, no new cbc.     PAST MEDICAL & SURGICAL HISTORY:  Hearing impairment      Hypertension      Prostate CA      History of appendectomy      History of tonsillectomy          ROS:    [ ] Unobtainable because:  [x ] All other systems negative    Constitutional: no fever, no chills, + fatigued   Head: no trauma  Eyes: no vision changes, no eye pain  ENT:  no sore throat, no rhinorrhea  Cardiovascular:  no chest pain, no palpitation  Respiratory:  no SOB, no cough  GI:  no abd pain, no vomiting, no diarrhea  urinary: no dysuria, no hematuria, no flank pain  musculoskeletal:  denies joint pain, no joint swelling  skin:  no rash  neurology:  no headache, no seizure, no change in mental status  psych: no anxiety, no depression         Allergies  No Known Allergies        ANTIMICROBIALS:  vancomycin  IVPB    vancomycin  IVPB 1000 every 12 hours      OTHER MEDS:  acetaminophen     Tablet .. 650 milliGRAM(s) Oral every 6 hours PRN  ascorbic acid 500 milliGRAM(s) Oral daily  enoxaparin Injectable 40 milliGRAM(s) SubCutaneous every 24 hours  folic acid 1 milliGRAM(s) Oral daily  ibuprofen  Tablet. 400 milliGRAM(s) Oral two times a day  melatonin 3 milliGRAM(s) Oral at bedtime PRN  multivitamin 1 Tablet(s) Oral daily  pantoprazole    Tablet 40 milliGRAM(s) Oral before breakfast  polyethylene glycol 3350 17 Gram(s) Oral daily  predniSONE   Tablet 20 milliGRAM(s) Oral daily      Vital Signs Last 24 Hrs  T(C): 36.6 (20 Jun 2023 11:28), Max: 37.7 (20 Jun 2023 05:23)  T(F): 97.8 (20 Jun 2023 11:28), Max: 99.9 (20 Jun 2023 05:23)  HR: 80 (20 Jun 2023 11:28) (73 - 83)  BP: 131/60 (20 Jun 2023 11:28) (124/77 - 135/77)  BP(mean): --  RR: 19 (20 Jun 2023 11:28) (18 - 19)  SpO2: 98% (20 Jun 2023 11:28) (95% - 98%)    Parameters below as of 20 Jun 2023 11:28  Patient On (Oxygen Delivery Method): room air        Physical Exam:  General: Fail-appearing Male in no acute distress. [place], RA  HEENT: AT/NC. PERRL. EOMI. Anicteric. Conjunctiva pink and moist. Oropharynx clear.   Neck: Not rigid. No sense of mass.  Nodes: None palpable.  Lungs: Grossly clear bilaterally, no wheezes, no rhonchi   Heart: Regular rate and rhythm.   Abdomen: Bowel sounds present and normoactive. Soft. Nondistended. Nontender.  Back: No spinal tenderness. No costovertebral angle tenderness.   : [place]-> yellow urine with sediment in the tube   Extremities: No cyanosis or clubbing. No edema.   MSK: Patient has very mild redness and warmth of the wrist bilaterally, first MCP joints bilaterally, with exquisite tenderness with any motion.  There is also pain and redness, though not as impressive, in the left second MCP joint there is no edema, in contrast to the above.  There is an effusion and exquisite range of motion tenderness in the right knee.  The left knee is unaffected.  There is also warmth and edema of the right first MTP joint.  Other joints do not appear to be acutely inflamed.  Skin: Warm. Dry. Good turgor. No rash. No vasculitic stigmata.  Psychiatric: Grossly appropriate affect and mood for situation.     WBC Count: 7.31 K/uL (06-19 @ 06:50)  WBC Count: 8.79 K/uL (06-17 @ 19:30)  WBC Count: 7.99 K/uL (06-17 @ 05:40)  WBC Count: 7.48 K/uL (06-16 @ 06:50)  WBC Count: 9.29 K/uL (06-15 @ 14:33)                            10.7   7.31  )-----------( 256      ( 19 Jun 2023 06:50 )             32.3       06-19    130<L>  |  99  |  24<H>  ----------------------------<  161<H>  3.3<L>   |  26  |  0.88    Ca    8.4<L>      19 Jun 2023 06:50            Creatinine Trend: 0.88<--, 0.80<--, 0.84<--, 1.04<--      MICROBIOLOGY:  Vancomycin Level, Trough: 10.0 ug/mL (06-20-23 @ 05:40)  v  .Blood Blood  06-17-23   No growth to date.  --  --      .Blood Blood  06-17-23   No growth to date.  --  --      Clean Catch Clean Catch (Midstream)  06-15-23   >100,000 CFU/ml Methicillin Resistant Staphylococcus aureus  10,000 - 49,000 CFU/mL Enterococcus faecalis  --  Methicillin resistant Staphylococcus aureus  Enterococcus faecalis    Rapid RVP Result: NotDetec (06-18 @ 11:55)    SARS-CoV-2: NotDetec (06-18-23 @ 11:55)  Rapid RVP Result: NotDetec (06-18-23 @ 11:55)    SARS-CoV-2: NotDetec (18 Jun 2023 11:55)    RADIOLOGY:

## 2023-06-20 NOTE — PROGRESS NOTE ADULT - ASSESSMENT
89 yo M w/ PMHx of Dementia, Prostate Ca and [place] presents due to generalized weakness. Reports he feels so weak that he was having trouble to put on his shirt today. Pt has chronic coreas, it was replaced in his urologists office earlier today and found to have dark, malodorous urine. Pt denies headache, dizziness, sob, chest pain, nausea, vomiting, fever, chills, abd pain.      MRSA and Enterococcus faecalis UTI #associated with Chronic coreas 2/2 to Prostate Ca   - no more fever.  cont iv vancomycin and vanco trough reported 10. Discussed with ID service?>>> Follow up further recs.     #Hyperkalemia  - improved.     Dementia. supportive care.     Anemia of chronic disease. HB stable.     Hx of prostate cancer and urinary retention. cont coreas. consulted and discussed with urology service about lupron injection.     Polyarthitis and pain all over. start ibuprofen and low dose prednisone.     Code: Full  VTE: Lovenox .   Diet: Regular    Time spent managing the patient is 55 mins.

## 2023-06-20 NOTE — PROGRESS NOTE ADULT - SUBJECTIVE AND OBJECTIVE BOX
CHIEF COMPLAINT: MRSA and Enterococcal faecalis UTI   No fever.   no vomiting   no chest pain or sob reported   No hematuria  Pain in legs and arms and hands     PHYSICAL EXAM:    GENERAL: Moderately built, no acute distress   CHEST/LUNG:  No wheezing, no crackles   HEART: Regular rate and rhythm; No murmurs  ABDOMEN: Soft, Nontender, Nondistended; Bowel sounds present. + coreas  EXTREMITIES:  No clubbing, cyanosis, or edema  Psychiatry: AAO x name,  and location only.      OBJECTIVE DATA:     Vital Signs Last 24 Hrs  T(C): 37.7 (2023 05:23), Max: 37.7 (2023 05:23)  T(F): 99.9 (2023 05:23), Max: 99.9 (2023 05:23)  HR: 73 (2023 05:23) (73 - 86)  BP: 135/77 (2023 05:23) (124/77 - 135/77)  BP(mean): --  RR: 18 (2023 05:23) (18 - 18)  SpO2: 95% (2023 05:23) (95% - 98%)    Parameters below as of 2023 05:23  Patient On (Oxygen Delivery Method): room air               Daily     Daily   LABS:                        10.7   7.31  )-----------( 256      ( 2023 06:50 )             32.3                 130<L>  |  99  |  24<H>  ----------------------------<  161<H>  3.3<L>   |  26  |  0.88    Ca    8.4<L>      2023 06:50                         CAPILLARY BLOOD GLUCOSE          Culture - Blood (collected )  Source: .Blood Blood  Preliminary Report ():    No growth to date.    Culture - Blood (collected )  Source: .Blood Blood  Preliminary Report ():    No growth to date.    Culture - Urine (collected 06-15)  Source: Clean Catch Clean Catch (Midstream)  Final Report ():    >100,000 CFU/ml Methicillin Resistant Staphylococcus aureus    10,000 - 49,000 CFU/mL Enterococcus faecalis  Organism: Methicillin resistant Staphylococcus aureus  Enterococcus faecalis ()  Organism: Enterococcus faecalis ()    Sensitivities:      Method Type: NATHANIEL      -  Ampicillin: S <=2 Predicts results to ampicillin/sulbactam, amoxacillin-clavulanate and  piperacillin-tazobactam.      -  Ciprofloxacin: R >2      -  Levofloxacin: R >4      -  Nitrofurantoin: S <=32 Should not be used to treat pyelonephritis.      -  Tetracycline: R >8      -  Vancomycin: S 1  Organism: Methicillin resistant Staphylococcus aureus ()    Sensitivities:      Method Type: NATHANIEL      -  Ampicillin/Sulbactam: R <=8/4      -  Cefazolin: R <=4      -  Daptomycin: S 0.5      -  Gentamicin: S <=1 Should not be used as monotherapy      -  Linezolid: S 2      -  Oxacillin: R >2      -  Penicillin: R >8      -  Rifampin: S <=1 Should not be used as monotherapy      -  Tetracycline: I 8      -  Trimethoprim/Sulfamethoxazole: S <=0.5/9.5      -  Vancomycin: S 1      MEDICATIONS  (STANDING):  ascorbic acid 500 milliGRAM(s) Oral daily  enoxaparin Injectable 40 milliGRAM(s) SubCutaneous every 24 hours  folic acid 1 milliGRAM(s) Oral daily  ibuprofen  Tablet. 400 milliGRAM(s) Oral two times a day  multivitamin 1 Tablet(s) Oral daily  pantoprazole    Tablet 40 milliGRAM(s) Oral before breakfast  polyethylene glycol 3350 17 Gram(s) Oral daily  vancomycin  IVPB 1000 milliGRAM(s) IV Intermittent every 12 hours  vancomycin  IVPB        MEDICATIONS  (PRN):  acetaminophen     Tablet .. 650 milliGRAM(s) Oral every 6 hours PRN Temp greater or equal to 38C (100.4F), Mild Pain (1 - 3)  melatonin 3 milliGRAM(s) Oral at bedtime PRN Insomnia

## 2023-06-20 NOTE — PROGRESS NOTE ADULT - NS ATTEND AMEND GEN_ALL_CORE FT
Attending Addendum--  Case reviewed with JA West. Her note reviewed and modified as appropriate.   Patient personally assessed and examined.  Seen earlier today, then d/w patient's wife later in the day  Patient had been placed on motrin, subsequently placed on prednisone 20mg after my assessment  Patient more alert, still oriented to self only.  Exam unchanged except that appears to have developed an IV catheter related phlebitis R AC area. Asked RN to remove IV.  Continue present Abx  Steroids/NSAIDS as per primary team  Serial exams of joints/IV site    Emerson Humphrey MD  Attending Physician  [facility]  Division of Infectious Diseases  [phone]  > 55 min total time spent

## 2023-06-20 NOTE — PROGRESS NOTE ADULT - ASSESSMENT
6/19:  Possible CAUTI POA  MRSA/E faecalis in urine  Inflammatory polyarthritis likely cause of patient's pain- B wrists, B MCP#1, L MCP #2, R knee, R MTP #1.  Doubt infectious etiology to polyarthritis  Stop Zosyn  Cont vanco for now  Rx vs ?gout as per medicine  Patient's wife asking about urology eval for Lupron shot    6/20: no fevers since 6/17, no new cbc, Cr ok, BCs with no growth to date, UC grew enterococcus and MRSA, Vanco level is 10.0 this morning, CT c/a/p - no acute findings, will continue with IV vanco for now.   On my today's exam, pt denies any significant pain in his joints with palpation, had some pain in his left shoulder.     Suggestions:  - continue Vancomycin IV for now  - monitor vancomycin levels  - monitoring vancomycin levels is required  - trend temperatures and wbc      Discussed with Dr. Humphrey  Discussed with pt's wife

## 2023-06-21 LAB
ANION GAP SERPL CALC-SCNC: 4 MMOL/L — LOW (ref 5–17)
BUN SERPL-MCNC: 33 MG/DL — HIGH (ref 7–23)
CALCIUM SERPL-MCNC: 9.3 MG/DL — SIGNIFICANT CHANGE UP (ref 8.5–10.1)
CHLORIDE SERPL-SCNC: 103 MMOL/L — SIGNIFICANT CHANGE UP (ref 96–108)
CO2 SERPL-SCNC: 28 MMOL/L — SIGNIFICANT CHANGE UP (ref 22–31)
CREAT SERPL-MCNC: 0.87 MG/DL — SIGNIFICANT CHANGE UP (ref 0.5–1.3)
EGFR: 84 ML/MIN/1.73M2 — SIGNIFICANT CHANGE UP
FLUAV AG NPH QL: SIGNIFICANT CHANGE UP
FLUBV AG NPH QL: SIGNIFICANT CHANGE UP
GLUCOSE SERPL-MCNC: 96 MG/DL — SIGNIFICANT CHANGE UP (ref 70–99)
HCT VFR BLD CALC: 33.9 % — LOW (ref 39–50)
HGB BLD-MCNC: 10.8 G/DL — LOW (ref 13–17)
MCHC RBC-ENTMCNC: 26.4 PG — LOW (ref 27–34)
MCHC RBC-ENTMCNC: 31.9 G/DL — LOW (ref 32–36)
MCV RBC AUTO: 82.9 FL — SIGNIFICANT CHANGE UP (ref 80–100)
NRBC # BLD: 0 /100 WBCS — SIGNIFICANT CHANGE UP (ref 0–0)
PLATELET # BLD AUTO: 314 K/UL — SIGNIFICANT CHANGE UP (ref 150–400)
POTASSIUM SERPL-MCNC: 4.2 MMOL/L — SIGNIFICANT CHANGE UP (ref 3.5–5.3)
POTASSIUM SERPL-SCNC: 4.2 MMOL/L — SIGNIFICANT CHANGE UP (ref 3.5–5.3)
RBC # BLD: 4.09 M/UL — LOW (ref 4.2–5.8)
RBC # FLD: 14.1 % — SIGNIFICANT CHANGE UP (ref 10.3–14.5)
SARS-COV-2 RNA SPEC QL NAA+PROBE: SIGNIFICANT CHANGE UP
SODIUM SERPL-SCNC: 135 MMOL/L — SIGNIFICANT CHANGE UP (ref 135–145)
VANCOMYCIN TROUGH SERPL-MCNC: 15.4 UG/ML — SIGNIFICANT CHANGE UP (ref 10–20)
WBC # BLD: 6.53 K/UL — SIGNIFICANT CHANGE UP (ref 3.8–10.5)
WBC # FLD AUTO: 6.53 K/UL — SIGNIFICANT CHANGE UP (ref 3.8–10.5)

## 2023-06-21 PROCEDURE — 99232 SBSQ HOSP IP/OBS MODERATE 35: CPT | Mod: FS

## 2023-06-21 PROCEDURE — 99232 SBSQ HOSP IP/OBS MODERATE 35: CPT

## 2023-06-21 RX ORDER — AMOXICILLIN 250 MG/5ML
500 SUSPENSION, RECONSTITUTED, ORAL (ML) ORAL
Refills: 0 | Status: DISCONTINUED | OUTPATIENT
Start: 2023-06-22 | End: 2023-06-23

## 2023-06-21 RX ADMIN — ENOXAPARIN SODIUM 40 MILLIGRAM(S): 100 INJECTION SUBCUTANEOUS at 17:15

## 2023-06-21 RX ADMIN — POLYETHYLENE GLYCOL 3350 17 GRAM(S): 17 POWDER, FOR SOLUTION ORAL at 11:16

## 2023-06-21 RX ADMIN — Medication 250 MILLIGRAM(S): at 17:12

## 2023-06-21 RX ADMIN — Medication 1 MILLIGRAM(S): at 11:17

## 2023-06-21 RX ADMIN — Medication 20 MILLIGRAM(S): at 05:45

## 2023-06-21 RX ADMIN — Medication 400 MILLIGRAM(S): at 17:48

## 2023-06-21 RX ADMIN — Medication 500 MILLIGRAM(S): at 11:17

## 2023-06-21 RX ADMIN — Medication 400 MILLIGRAM(S): at 05:44

## 2023-06-21 RX ADMIN — Medication 1 TABLET(S): at 11:16

## 2023-06-21 RX ADMIN — Medication 400 MILLIGRAM(S): at 17:14

## 2023-06-21 RX ADMIN — PANTOPRAZOLE SODIUM 40 MILLIGRAM(S): 20 TABLET, DELAYED RELEASE ORAL at 05:44

## 2023-06-21 RX ADMIN — Medication 250 MILLIGRAM(S): at 05:46

## 2023-06-21 NOTE — PROGRESS NOTE ADULT - ASSESSMENT
89 yo M w/ PMHx of Dementia, Prostate Ca and [place] presents due to generalized weakness. Reports he feels so weak that he was having trouble to put on his shirt today. Pt has chronic coreas, it was replaced in his urologists office earlier today and found to have dark, malodorous urine. Pt denies headache, dizziness, sob, chest pain, nausea, vomiting, fever, chills, abd pain.      MRSA and Enterococcus faecalis UTI #associated with Chronic coreas 2/2 to Prostate Ca   - no more fever.  - IV vancomycin changed to Augmentin & Bactrim by ID        Dementia. supportive care.     Anemia of chronic disease. HB stable.     Hx of prostate cancer and urinary retention. cont coreas. consulted and discussed with urology service about lupron injection.     Polyarthitis and pain all over. start ibuprofen and low dose prednisone.     Code: Full  VTE: Lovenox .  89 yo M w/ history of Dementia, [place], Prostate Ca w/ urinary retention and  chronic Harvey  presented w/ generalized weakness and was admitted w/ MRSA and Enterococcus faecalis UTI.    MRSA and Enterococcus faecalis UTI w/ associated with Chronic Harvey due to Prostate Ca   - no more fever  - NGTD on blood cxs  - IV vancomycin changed to Augmentin & Bactrim by ID      Dementia  - supportive care    Anemia of chronic disease  - H&H stable    Hx of prostate cancer and urinary retention  - c/w Harvey  - as per pharmacy, Lupron injection is not available in the hospital      Polyarthritis pain  - c/w ibuprofen and low dose prednisone    Constipation  - c/w Miralax    PreDM  - Hgb A1C is 5.9 - wife informed    Prophylaxis:  DVT: Lovenox  GI: Protonix

## 2023-06-21 NOTE — PROGRESS NOTE ADULT - SUBJECTIVE AND OBJECTIVE BOX
TARA HARMAN  MRN-[id number]    Follow Up:  UTI    Interval History: pt was seen and examined earlier, no acute distress, wife present, pt is feeling better. Pt is afebrile, RA, no wbc.     PAST MEDICAL & SURGICAL HISTORY:  Hearing impairment      Hypertension      Prostate CA      History of appendectomy      History of tonsillectomy          ROS:    [ ] Unobtainable because:  [x ] All other systems negative    Constitutional: no fever, no chills  Head: no trauma  Eyes: no vision changes, no eye pain  ENT:  no sore throat, no rhinorrhea  Cardiovascular:  no chest pain, no palpitation  Respiratory:  no SOB, no cough  GI:  no abd pain, no vomiting, no diarrhea  urinary: no dysuria, no hematuria, no flank pain  musculoskeletal:  no joint pain, no joint swelling  skin:  no rash  neurology:  no headache, no seizure, no change in mental status  psych: no anxiety, no depression         Allergies  No Known Allergies        ANTIMICROBIALS:  vancomycin  IVPB    vancomycin  IVPB 1000 every 12 hours      OTHER MEDS:  acetaminophen     Tablet .. 650 milliGRAM(s) Oral every 6 hours PRN  ascorbic acid 500 milliGRAM(s) Oral daily  enoxaparin Injectable 40 milliGRAM(s) SubCutaneous every 24 hours  folic acid 1 milliGRAM(s) Oral daily  ibuprofen  Tablet. 400 milliGRAM(s) Oral two times a day  melatonin 3 milliGRAM(s) Oral at bedtime PRN  multivitamin 1 Tablet(s) Oral daily  pantoprazole    Tablet 40 milliGRAM(s) Oral before breakfast  polyethylene glycol 3350 17 Gram(s) Oral daily  predniSONE   Tablet 20 milliGRAM(s) Oral daily      Vital Signs Last 24 Hrs  T(C): 36.7 (21 Jun 2023 11:03), Max: 37.1 (20 Jun 2023 17:05)  T(F): 98 (21 Jun 2023 11:03), Max: 98.8 (20 Jun 2023 17:05)  HR: 87 (21 Jun 2023 11:03) (67 - 87)  BP: 144/76 (21 Jun 2023 11:03) (129/65 - 162/81)  BP(mean): --  RR: 18 (21 Jun 2023 11:03) (18 - 19)  SpO2: 97% (21 Jun 2023 11:03) (95% - 98%)    Parameters below as of 21 Jun 2023 11:03  Patient On (Oxygen Delivery Method): room air        Physical Exam:  General: Fail-appearing Male in no acute distress. [place], RA  HEENT: AT/NC. PERRL. EOMI. Anicteric. Conjunctiva pink and moist. Oropharynx clear.   Neck: Not rigid. No sense of mass.  Nodes: None palpable.  Lungs: Grossly clear bilaterally, no wheezes, no rhonchi   Heart: Regular rate and rhythm.   Abdomen: Bowel sounds present and normoactive. Soft. Nondistended. Nontender.  Back: No spinal tenderness. No costovertebral angle tenderness.   : Harvey-> yellow urine with sediment in the tube   Extremities: No cyanosis or clubbing. No edema.   MSK: no tenderness in pt's joints on today's exam, no synovitis   Skin: Warm. Dry. Good turgor. No rash. No vasculitic stigmata.  Psychiatric: Grossly appropriate affect and mood for situation.     WBC Count: 6.53 K/uL (06-21 @ 06:11)  WBC Count: 7.31 K/uL (06-19 @ 06:50)  WBC Count: 8.79 K/uL (06-17 @ 19:30)  WBC Count: 7.99 K/uL (06-17 @ 05:40)  WBC Count: 7.48 K/uL (06-16 @ 06:50)  WBC Count: 9.29 K/uL (06-15 @ 14:33)                            10.8   6.53  )-----------( 314      ( 21 Jun 2023 06:11 )             33.9       06-21    135  |  103  |  33<H>  ----------------------------<  96  4.2   |  28  |  0.87    Ca    9.3      21 Jun 2023 06:11        Urinalysis Basic - ( 21 Jun 2023 06:11 )    Color: x / Appearance: x / SG: x / pH: x  Gluc: 96 mg/dL / Ketone: x  / Bili: x / Urobili: x   Blood: x / Protein: x / Nitrite: x   Leuk Esterase: x / RBC: x / WBC x   Sq Epi: x / Non Sq Epi: x / Bacteria: x        Creatinine Trend: 0.87<--, 0.88<--, 0.80<--, 0.84<--, 1.04<--      MICROBIOLOGY:  v  .Blood Blood  06-17-23   No growth to date.  --  --      .Blood Blood  06-17-23   No growth to date.  --  --      Clean Catch Clean Catch (Midstream)  06-17-23   <10,000 CFU/mL Normal Urogenital Adwoa  --  --      Clean Catch Clean Catch (Midstream)  06-15-23   >100,000 CFU/ml Methicillin Resistant Staphylococcus aureus  10,000 - 49,000 CFU/mL Enterococcus faecalis  --  Methicillin resistant Staphylococcus aureus  Enterococcus faecalis          Rapid RVP Result: NotDetec (06-18 @ 11:55)      SARS-CoV-2 Result: NotDetec (06-21-23 @ 12:10)  SARS-CoV-2: NotDetec (06-18-23 @ 11:55)  Rapid RVP Result: NotDetec (06-18-23 @ 11:55)    SARS-CoV-2: NotDetec (18 Jun 2023 11:55)    RADIOLOGY:

## 2023-06-21 NOTE — PROGRESS NOTE ADULT - NS ATTEND AMEND GEN_ALL_CORE FT
Attending Addendum--  Case reviewed with JA West. Her note reviewed and modified as appropriate.   Patient personally assessed and examined.  Patient seen earlier today, remains oriented to self only, wondering where his wife is.  Joint findings markedly improved  No concern of active or uncontrolled infection on exam  I do not think an extended course of antibiotics here has a favorable R:B ratio.  Antibiotics as above  Steroids per primary team  Please recall PRN  I'll sign off at this time.   Thank you for the courtesy of this referral.    Emerson Humphrey MD  Attending Physician  [facility]  Division of Infectous Diseases  [phone]

## 2023-06-21 NOTE — PROGRESS NOTE ADULT - SUBJECTIVE AND OBJECTIVE BOX
Patient is a 88y old  Male who presents with a chief complaint of UTI (21 Jun 2023 15:42)      INTERVAL HPI/OVERNIGHT EVENTS:    MEDICATIONS  (STANDING):  ascorbic acid 500 milliGRAM(s) Oral daily  enoxaparin Injectable 40 milliGRAM(s) SubCutaneous every 24 hours  folic acid 1 milliGRAM(s) Oral daily  ibuprofen  Tablet. 400 milliGRAM(s) Oral two times a day  multivitamin 1 Tablet(s) Oral daily  pantoprazole    Tablet 40 milliGRAM(s) Oral before breakfast  polyethylene glycol 3350 17 Gram(s) Oral daily  predniSONE   Tablet 20 milliGRAM(s) Oral daily    MEDICATIONS  (PRN):  acetaminophen     Tablet .. 650 milliGRAM(s) Oral every 6 hours PRN Temp greater or equal to 38C (100.4F), Mild Pain (1 - 3)  melatonin 3 milliGRAM(s) Oral at bedtime PRN Insomnia      Allergies    No Known Allergies    Intolerances        Vital Signs Last 24 Hrs  T(C): 37.1 (21 Jun 2023 17:13), Max: 37.1 (21 Jun 2023 17:13)  T(F): 98.7 (21 Jun 2023 17:13), Max: 98.7 (21 Jun 2023 17:13)  HR: 65 (21 Jun 2023 17:13) (65 - 87)  BP: 151/74 (21 Jun 2023 17:13) (129/65 - 162/81)  BP(mean): --  RR: 18 (21 Jun 2023 17:13) (18 - 18)  SpO2: 95% (21 Jun 2023 17:13) (95% - 98%)    Parameters below as of 21 Jun 2023 17:13  Patient On (Oxygen Delivery Method): room air        PHYSICAL EXAM:  GENERAL: NAD, well-groomed, well-developed  HEAD:  Atraumatic, Normocephalic  EYES: EOMI, PERRLA, conjunctiva and sclera clear  ENMT: No tonsillar erythema, exudates, or enlargement; Moist mucous membranes, Good dentition, No lesions  NECK: Supple, No JVD, Normal thyroid  NERVOUS SYSTEM:  Alert & Oriented X3, Good concentration; Motor Strength 5/5 B/L upper and lower extremities; DTRs 2+ intact and symmetric  CHEST/LUNG: Clear to auscultation bilaterally; No rales, rhonchi, wheezing, or rubs  HEART: Regular rate and rhythm; No murmurs, rubs, or gallops  ABDOMEN: Soft, Nontender, Nondistended; Bowel sounds present  EXTREMITIES:  2+ Peripheral Pulses, No clubbing, cyanosis, or edema  LYMPH: No lymphadenopathy noted  SKIN: No rashes or lesions    LABS:                        10.8   6.53  )-----------( 314      ( 21 Jun 2023 06:11 )             33.9     06-21    135  |  103  |  33<H>  ----------------------------<  96  4.2   |  28  |  0.87    Ca    9.3      21 Jun 2023 06:11        Urinalysis Basic - ( 21 Jun 2023 06:11 )    Color: x / Appearance: x / SG: x / pH: x  Gluc: 96 mg/dL / Ketone: x  / Bili: x / Urobili: x   Blood: x / Protein: x / Nitrite: x   Leuk Esterase: x / RBC: x / WBC x   Sq Epi: x / Non Sq Epi: x / Bacteria: x      CAPILLARY BLOOD GLUCOSE          Culture - Blood (collected 17 Jun 2023 19:35)  Source: .Blood Blood  Preliminary Report (19 Jun 2023 01:02):    No growth to date.    Culture - Blood (collected 17 Jun 2023 19:30)  Source: .Blood Blood  Preliminary Report (19 Jun 2023 01:02):    No growth to date.    Culture - Urine (collected 17 Jun 2023 18:30)  Source: Clean Catch Clean Catch (Midstream)  Final Report (20 Jun 2023 22:44):    <10,000 CFU/mL Normal Urogenital Adwoa      RADIOLOGY & ADDITIONAL TESTS:    06-20-23 @ 07:01  -  06-21-23 @ 07:00  --------------------------------------------------------  IN:    IV PiggyBack: 250 mL    Oral Fluid: 370 mL  Total IN: 620 mL    OUT:    Indwelling Catheter - Urethral (mL): 600 mL    Stool (mL): 0 mL  Total OUT: 600 mL    Total NET: 20 mL       89 yo M w/ history of Dementia, [place], Prostate Ca w/ urinary retention and  chronic Harvey  presented w/ generalized weakness and was admitted w/ MRSA and Enterococcus faecalis UTI. He is lying in bed in NAD.    MEDICATIONS  (STANDING):  ascorbic acid 500 milliGRAM(s) Oral daily  enoxaparin Injectable 40 milliGRAM(s) SubCutaneous every 24 hours  folic acid 1 milliGRAM(s) Oral daily  ibuprofen  Tablet. 400 milliGRAM(s) Oral two times a day  multivitamin 1 Tablet(s) Oral daily  pantoprazole    Tablet 40 milliGRAM(s) Oral before breakfast  polyethylene glycol 3350 17 Gram(s) Oral daily  predniSONE   Tablet 20 milliGRAM(s) Oral daily    MEDICATIONS  (PRN):  acetaminophen     Tablet .. 650 milliGRAM(s) Oral every 6 hours PRN Temp greater or equal to 38C (100.4F), Mild Pain (1 - 3)  melatonin 3 milliGRAM(s) Oral at bedtime PRN Insomnia      Allergies    No Known Allergies    Intolerances        Vital Signs Last 24 Hrs  T(C): 37.1 (21 Jun 2023 17:13), Max: 37.1 (21 Jun 2023 17:13)  T(F): 98.7 (21 Jun 2023 17:13), Max: 98.7 (21 Jun 2023 17:13)  HR: 65 (21 Jun 2023 17:13) (65 - 87)  BP: 151/74 (21 Jun 2023 17:13) (129/65 - 162/81)   RR: 18 (21 Jun 2023 17:13) (18 - 18)  SpO2: 95% (21 Jun 2023 17:13) (95% - 98%)    Parameters below as of 21 Jun 2023 17:13  Patient On (Oxygen Delivery Method): room air        PHYSICAL EXAM:  GENERAL: NAD, well-groomed, well-developed  HEAD:  Atraumatic, Normocephalic  EYES: EOMI, PERRLA   NECK: Supple   NERVOUS SYSTEM:  Alert & confused  CHEST/LUNG: Clear to auscultation bilaterally; No rales, rhonchi, wheezing, or rubs  HEART: Regular rate and rhythm; No murmurs, rubs, or gallops  ABDOMEN: Soft, Nontender, Nondistended; Bowel sounds present  EXTREMITIES: No clubbing, cyanosis, or edema     LABS:                        10.8   6.53  )-----------( 314      ( 21 Jun 2023 06:11 )             33.9     06-21    135  |  103  |  33<H>  ----------------------------<  96  4.2   |  28  |  0.87    Ca    9.3      21 Jun 2023 06:11        Urinalysis Basic - ( 21 Jun 2023 06:11 )    Color: x / Appearance: x / SG: x / pH: x  Gluc: 96 mg/dL / Ketone: x  / Bili: x / Urobili: x   Blood: x / Protein: x / Nitrite: x   Leuk Esterase: x / RBC: x / WBC x   Sq Epi: x / Non Sq Epi: x / Bacteria: x      CAPILLARY BLOOD GLUCOSE          Culture - Blood (collected 17 Jun 2023 19:35)  Source: .Blood Blood  Preliminary Report (19 Jun 2023 01:02):    No growth to date.    Culture - Blood (collected 17 Jun 2023 19:30)  Source: .Blood Blood  Preliminary Report (19 Jun 2023 01:02):    No growth to date.    Culture - Urine (collected 17 Jun 2023 18:30)  Source: Clean Catch Clean Catch (Midstream)  Final Report (20 Jun 2023 22:44):    <10,000 CFU/mL Normal Urogenital Adwoa      RADIOLOGY & ADDITIONAL TESTS:    06-20-23 @ 07:01  -  06-21-23 @ 07:00  --------------------------------------------------------  IN:    IV PiggyBack: 250 mL    Oral Fluid: 370 mL  Total IN: 620 mL    OUT:    Indwelling Catheter - Urethral (mL): 600 mL    Stool (mL): 0 mL  Total OUT: 600 mL    Total NET: 20 mL

## 2023-06-21 NOTE — PROGRESS NOTE ADULT - ASSESSMENT
6/19:  Possible CAUTI POA  MRSA/E faecalis in urine  Inflammatory polyarthritis likely cause of patient's pain- B wrists, B MCP#1, L MCP #2, R knee, R MTP #1.  Doubt infectious etiology to polyarthritis  Stop Zosyn  Cont vanco for now  Rx vs ?gout as per medicine  Patient's wife asking about urology eval for Lupron shot    6/20: no fevers since 6/17, no new cbc, Cr ok, BCs with no growth to date, UC grew enterococcus and MRSA, Vanco level is 10.0 this morning, CT c/a/p - no acute findings, will continue with IV vanco for now.   On my today's exam, pt denies any significant pain in his joints with palpation, had some pain in his left shoulder.   Attending Addendum--  Case reviewed with NP Christianne West. Her note reviewed and modified as appropriate.   Patient personally assessed and examined.  Seen earlier today, then d/w patient's wife later in the day  Patient had been placed on motrin, subsequently placed on prednisone 20mg after my assessment  Patient more alert, still oriented to self only.  Exam unchanged except that appears to have developed an IV catheter related phlebitis R AC area. Asked RN to remove IV.  Continue present Abx  Steroids/NSAIDS as per primary team  Serial exams of joints/IV site    6/21: improving, no joint tenderness on today's exam, no fevers, RA, no WBC, Cr ok, BCs with no growth to date, on Vancomycin IV. Pt needs two more days of abx, ok to change to Bactrim and Amoxicillin to cover both organisms (enterococcus and MRSA).     Suggestions:  - stop Vancomycin IV   - start po Bactrim - needs two more days   - start po Amoxicillin - needs two more days   - trend temperatures and wbc    will sign off, re-consult as needed       Discussed with Dr. Humphrey  Discussed with pt's wife  Msg sent to Dr. Anne

## 2023-06-22 ENCOUNTER — TRANSCRIPTION ENCOUNTER (OUTPATIENT)
Age: 88
End: 2023-06-22

## 2023-06-22 PROCEDURE — 99232 SBSQ HOSP IP/OBS MODERATE 35: CPT

## 2023-06-22 RX ORDER — CHLORHEXIDINE GLUCONATE 213 G/1000ML
1 SOLUTION TOPICAL
Refills: 0 | Status: DISCONTINUED | OUTPATIENT
Start: 2023-06-22 | End: 2023-06-23

## 2023-06-22 RX ORDER — AMOXICILLIN 250 MG/5ML
1 SUSPENSION, RECONSTITUTED, ORAL (ML) ORAL
Qty: 0 | Refills: 0 | DISCHARGE
Start: 2023-06-22

## 2023-06-22 RX ORDER — ACETAMINOPHEN 500 MG
2 TABLET ORAL
Qty: 0 | Refills: 0 | DISCHARGE
Start: 2023-06-22

## 2023-06-22 RX ADMIN — Medication 400 MILLIGRAM(S): at 06:12

## 2023-06-22 RX ADMIN — Medication 500 MILLIGRAM(S): at 06:12

## 2023-06-22 RX ADMIN — Medication 650 MILLIGRAM(S): at 13:00

## 2023-06-22 RX ADMIN — CHLORHEXIDINE GLUCONATE 1 APPLICATION(S): 213 SOLUTION TOPICAL at 17:22

## 2023-06-22 RX ADMIN — Medication 20 MILLIGRAM(S): at 06:12

## 2023-06-22 RX ADMIN — Medication 400 MILLIGRAM(S): at 17:21

## 2023-06-22 RX ADMIN — ENOXAPARIN SODIUM 40 MILLIGRAM(S): 100 INJECTION SUBCUTANEOUS at 17:22

## 2023-06-22 RX ADMIN — Medication 500 MILLIGRAM(S): at 17:22

## 2023-06-22 RX ADMIN — Medication 400 MILLIGRAM(S): at 18:27

## 2023-06-22 RX ADMIN — Medication 400 MILLIGRAM(S): at 07:12

## 2023-06-22 RX ADMIN — PANTOPRAZOLE SODIUM 40 MILLIGRAM(S): 20 TABLET, DELAYED RELEASE ORAL at 06:12

## 2023-06-22 RX ADMIN — Medication 1 TABLET(S): at 06:12

## 2023-06-22 RX ADMIN — Medication 1 TABLET(S): at 17:21

## 2023-06-22 NOTE — DIETITIAN INITIAL EVALUATION ADULT - PERTINENT LABORATORY DATA
equal bilaterally
06-21    135  |  103  |  33<H>  ----------------------------<  96  4.2   |  28  |  0.87    Ca    9.3      21 Jun 2023 06:11    A1C with Estimated Average Glucose Result: 5.9 % (06-16-23 @ 06:50); pre-DM range

## 2023-06-22 NOTE — DISCHARGE NOTE PROVIDER - PROVIDER TOKENS
FREE:[LAST:[your PCP],PHONE:[(   )    -],FAX:[(   )    -]],FREE:[LAST:[your Urologist],PHONE:[(   )    -],FAX:[(   )    -]]

## 2023-06-22 NOTE — DISCHARGE NOTE PROVIDER - CARE PROVIDER_API CALL
your PCP,   Phone: (   )    -  Fax: (   )    -  Follow Up Time:     your Urologist,   Phone: (   )    -  Fax: (   )    -  Follow Up Time:

## 2023-06-22 NOTE — DIETITIAN INITIAL EVALUATION ADULT - PERTINENT MEDS FT
MEDICATIONS  (STANDING):  amoxicillin 500 milliGRAM(s) Oral two times a day  ascorbic acid 500 milliGRAM(s) Oral daily  chlorhexidine 2% Cloths 1 Application(s) Topical <User Schedule>  enoxaparin Injectable 40 milliGRAM(s) SubCutaneous every 24 hours  folic acid 1 milliGRAM(s) Oral daily  ibuprofen  Tablet. 400 milliGRAM(s) Oral two times a day  multivitamin 1 Tablet(s) Oral daily  pantoprazole    Tablet 40 milliGRAM(s) Oral before breakfast  polyethylene glycol 3350 17 Gram(s) Oral daily  predniSONE   Tablet 20 milliGRAM(s) Oral daily  trimethoprim  160 mG/sulfamethoxazole 800 mG 1 Tablet(s) Oral two times a day    MEDICATIONS  (PRN):  acetaminophen     Tablet .. 650 milliGRAM(s) Oral every 6 hours PRN Temp greater or equal to 38C (100.4F), Mild Pain (1 - 3)  melatonin 3 milliGRAM(s) Oral at bedtime PRN Insomnia

## 2023-06-22 NOTE — DIETITIAN INITIAL EVALUATION ADULT - OTHER INFO
Pt with PMHx of dementia, [place], HTN, prostate cancer with urinary retention and chronic Harvey.  Pt presented with generalized weakness; admitted with MRSA and Enterococcus faecalis UTI; on contact isolation.  Pt appears slightly disoriented; dementia hx noted. Spouse at bedside provided information regarding pt's diet and wt hx.  Pt with mostly good appetite and good PO intake PTA; does not follow any dietary restrictions at home. Currently with variable PO intake during admission; consuming % of documented meals as per flow sheets. No reports of any chew/swallowing difficulty or any nausea or vomiting; with constipation (on Miralax). No known food allergies.  Spouse reports ht 5'10" and UBW approximately 170 lbs.; most recent wt from 06/15 noted as 175 lbs. Pt with PMHx of dementia, [place], HTN, prostate cancer with urinary retention and chronic Harvey.  Pt presented with generalized weakness; admitted with MRSA and Enterococcus faecalis UTI; on contact isolation.  Pt appears slightly disoriented; dementia hx noted. Spouse at bedside provided information regarding pt's diet and wt hx.  Pt with mostly good appetite and good PO intake PTA; does not follow any dietary restrictions at home. Currently with variable PO intake during admission; consuming % of documented meals as per flow sheets. No reports of any chew/swallowing difficulty or any nausea or vomiting; with constipation (on Miralax). No known food allergies.  Spouse reports ht 5'10" and UBW approximately 170 lbs.; most recent wt from 06/15 noted as 175 lbs.  Pt medically stable for discharge; awaiting bed availability at [facility].  RD remains available.

## 2023-06-22 NOTE — DIETITIAN INITIAL EVALUATION ADULT - WEIGHT USED FOR CALCULATIONS
IBW Class I (easy) - visualization of the soft palate, fauces, uvula, and both anterior and posterior pillars

## 2023-06-22 NOTE — PROGRESS NOTE ADULT - SUBJECTIVE AND OBJECTIVE BOX
89 yo M w/ history of Dementia, [place], Prostate Ca w/ urinary retention and  chronic Harvey  presented w/ generalized weakness and was admitted w/ MRSA and Enterococcus faecalis UTI. He is lying in bed in NAD.     MEDICATIONS  (STANDING):  amoxicillin 500 milliGRAM(s) Oral two times a day  ascorbic acid 500 milliGRAM(s) Oral daily  chlorhexidine 2% Cloths 1 Application(s) Topical <User Schedule>  enoxaparin Injectable 40 milliGRAM(s) SubCutaneous every 24 hours  folic acid 1 milliGRAM(s) Oral daily  ibuprofen  Tablet. 400 milliGRAM(s) Oral two times a day  multivitamin 1 Tablet(s) Oral daily  pantoprazole    Tablet 40 milliGRAM(s) Oral before breakfast  polyethylene glycol 3350 17 Gram(s) Oral daily  predniSONE   Tablet 20 milliGRAM(s) Oral daily  trimethoprim  160 mG/sulfamethoxazole 800 mG 1 Tablet(s) Oral two times a day    MEDICATIONS  (PRN):  acetaminophen     Tablet .. 650 milliGRAM(s) Oral every 6 hours PRN Temp greater or equal to 38C (100.4F), Mild Pain (1 - 3)  melatonin 3 milliGRAM(s) Oral at bedtime PRN Insomnia      Allergies    No Known Allergies    Intolerances       Vital Signs Last 24 Hrs  T(C): 36.9 (22 Jun 2023 17:16), Max: 36.9 (22 Jun 2023 17:16)  T(F): 98.4 (22 Jun 2023 17:16), Max: 98.4 (22 Jun 2023 17:16)  HR: 54 (22 Jun 2023 17:16) (54 - 95)  BP: 151/75 (22 Jun 2023 17:16) (129/74 - 157/69)  RR: 18 (22 Jun 2023 17:16) (18 - 18)  SpO2: 97% (22 Jun 2023 17:16) (93% - 97%)    Parameters below as of 22 Jun 2023 17:16  Patient On (Oxygen Delivery Method): room air     PHYSICAL EXAM:  GENERAL: NAD, well-groomed, well-developed  HEAD:  Atraumatic, Normocephalic  EYES: EOMI, PERRLA   NECK: Supple   NERVOUS SYSTEM:  Alert & confused  CHEST/LUNG: Clear to auscultation bilaterally; No rales, rhonchi, wheezing, or rubs  HEART: Regular rate and rhythm; No murmurs, rubs, or gallops  ABDOMEN: Soft, Nontender, Nondistended; Bowel sounds present  EXTREMITIES: No clubbing, cyanosis, or edema     LABS:                        10.8   6.53  )-----------( 314      ( 21 Jun 2023 06:11 )             33.9     06-21    135  |  103  |  33<H>  ----------------------------<  96  4.2   |  28  |  0.87    Ca    9.3      21 Jun 2023 06:11        Urinalysis Basic - ( 21 Jun 2023 06:11 )    Color: x / Appearance: x / SG: x / pH: x  Gluc: 96 mg/dL / Ketone: x  / Bili: x / Urobili: x   Blood: x / Protein: x / Nitrite: x   Leuk Esterase: x / RBC: x / WBC x   Sq Epi: x / Non Sq Epi: x / Bacteria: x      CAPILLARY BLOOD GLUCOSE          Culture - Blood (collected 17 Jun 2023 19:35)  Source: .Blood Blood  Preliminary Report (19 Jun 2023 01:02):    No growth to date.    Culture - Blood (collected 17 Jun 2023 19:30)  Source: .Blood Blood  Preliminary Report (19 Jun 2023 01:02):    No growth to date.      RADIOLOGY & ADDITIONAL TESTS:    06-21-23 @ 07:01  -  06-22-23 @ 07:00  --------------------------------------------------------  IN:  Total IN: 0 mL    OUT:    Indwelling Catheter - Urethral (mL): 1350 mL    Stool (mL): 0 mL  Total OUT: 1350 mL    Total NET: -1350 mL      06-22-23 @ 07:01  -  06-22-23 @ 18:30  --------------------------------------------------------  IN:  Total IN: 0 mL    OUT:    Indwelling Catheter - Urethral (mL): 600 mL  Total OUT: 600 mL    Total NET: -600 mL

## 2023-06-22 NOTE — DISCHARGE NOTE NURSING/CASE MANAGEMENT/SOCIAL WORK - PATIENT PORTAL LINK FT
You can access the FollowMyHealth Patient Portal offered by [facility] by registering at the following website: [email or web address]. By joining [email or web address]’s FollowMyHealth portal, you will also be able to view your health information using other applications (apps) compatible with our system.

## 2023-06-22 NOTE — DIETITIAN INITIAL EVALUATION ADULT - PHYSCIAL ASSESSMENT
Pt appears generally well nourished with only mild clavicle wasting and mild orbital depletion noted

## 2023-06-22 NOTE — DIETITIAN INITIAL EVALUATION ADULT - NSFNSGIIOFT_GEN_A_CORE
06-21-23 @ 07:01  -  06-22-23 @ 07:00  --------------------------------------------------------  OUT:    Stool (mL): 0 mL  Total OUT: 0 mL    Total NET: 0 mL

## 2023-06-22 NOTE — DISCHARGE NOTE PROVIDER - NSDCMRMEDTOKEN_GEN_ALL_CORE_FT
acetaminophen 325 mg oral tablet: 2 tab(s) orally every 6 hours As needed Temp greater or equal to 38C (100.4F), Mild Pain (1 - 3)  amoxicillin 500 mg oral capsule: 1 cap(s) orally 2 times a day x 2 more days  ascorbic acid 500 mg oral tablet: 1 tab(s) orally once a day  folic acid 1 mg oral tablet: 1 tab(s) orally once a day  Multiple Vitamins oral tablet: 1 tab(s) orally once a day  pantoprazole 40 mg oral delayed release tablet: 1 tab(s) orally once a day MDD:1 tablet  polyethylene glycol 3350 oral powder for reconstitution: 17 gram(s) orally once a day  sulfamethoxazole-trimethoprim 800 mg-160 mg oral tablet: 1 tab(s) orally 2 times a day x 2 more days   acetaminophen 325 mg oral tablet: 2 tab(s) orally every 6 hours As needed Temp greater or equal to 38C (100.4F), Mild Pain (1 - 3)  amoxicillin 500 mg oral capsule: 1 cap(s) orally 2 times a day x 1 more days  ascorbic acid 500 mg oral tablet: 1 tab(s) orally once a day  folic acid 1 mg oral tablet: 1 tab(s) orally once a day  Multiple Vitamins oral tablet: 1 tab(s) orally once a day  pantoprazole 40 mg oral delayed release tablet: 1 tab(s) orally once a day MDD:1 tablet  polyethylene glycol 3350 oral powder for reconstitution: 17 gram(s) orally once a day  sulfamethoxazole-trimethoprim 800 mg-160 mg oral tablet: 1 tab(s) orally 2 times a day x 1 more days

## 2023-06-22 NOTE — DISCHARGE NOTE PROVIDER - HOSPITAL COURSE
89 yo M w/ history of Dementia, [place], Prostate Ca w/ urinary retention and chronic Harvey presented w/ generalized weakness and was admitted w/ MRSA and Enterococcus faecalis UTI with Chronic Harvey due to Prostate Ca. Pt was put on Rocephin, which was changed to IV vancomycin and then changed to Augmentin & Bactrim by ID. There was NGTD on blood cxs. Of note, he was found to have PreDM w/ Hgb A1C is 5.9 and his wife was informed of this.    Discharge time: 43 minutes       Vital Signs Last 24 Hrs  T(C): 36.7 (22 Jun 2023 11:19), Max: 37.1 (21 Jun 2023 17:13)  T(F): 98 (22 Jun 2023 11:19), Max: 98.7 (21 Jun 2023 17:13)  HR: 60 (22 Jun 2023 11:19) (58 - 95)  BP: 129/74 (22 Jun 2023 11:19) (129/74 - 157/69)   RR: 18 (22 Jun 2023 11:19) (18 - 18)  SpO2: 97% (22 Jun 2023 11:19) (93% - 97%)    Parameters below as of 22 Jun 2023 11:19  Patient On (Oxygen Delivery Method): room air     PHYSICAL EXAM:  GENERAL: NAD, well-groomed, well-developed  HEAD:  Atraumatic, Normocephalic  EYES: EOMI, PERRLA   NECK: Supple   NERVOUS SYSTEM:  Alert & confused  CHEST/LUNG: Clear to auscultation bilaterally; No rales, rhonchi, wheezing, or rubs  HEART: Regular rate and rhythm; No murmurs, rubs, or gallops  ABDOMEN: Soft, Nontender, Nondistended; Bowel sounds present  EXTREMITIES: No clubbing, cyanosis, or edema 89 yo M w/ history of Dementia, [place], Prostate Ca w/ urinary retention and chronic Harvey presented w/ generalized weakness and was admitted w/ MRSA and Enterococcus faecalis UTI with Chronic Harvey due to Prostate Ca. Pt was put on Rocephin, which was changed to IV vancomycin and then changed to Augmentin & Bactrim by ID. There was NGTD on blood cxs. Of note, he was found to have PreDM w/ Hgb A1C is 5.9 and his wife was informed of this.    Discharge time: 43 minutes       Vital Signs Last 24 Hrs   T(C): 36.6 (23 Jun 2023 11:23), Max: 37.1 (22 Jun 2023 23:31)  T(F): 97.8 (23 Jun 2023 11:23), Max: 98.7 (22 Jun 2023 23:31)  HR: 64 (23 Jun 2023 11:23) (54 - 64)  BP: 159/65 (23 Jun 2023 11:23) (151/75 - 166/90)   RR: 18 (23 Jun 2023 11:23) (18 - 18)  SpO2: 96% (23 Jun 2023 11:23) (96% - 98%)     PHYSICAL EXAM:  GENERAL: NAD, well-groomed, well-developed  HEAD:  Atraumatic, Normocephalic  EYES: EOMI, PERRLA   NECK: Supple   NERVOUS SYSTEM:  Alert & confused  CHEST/LUNG: Clear to auscultation bilaterally; No rales, rhonchi, wheezing, or rubs  HEART: Regular rate and rhythm; No murmurs, rubs, or gallops  ABDOMEN: Soft, Nontender, Nondistended; Bowel sounds present  EXTREMITIES: No clubbing, cyanosis, or edema

## 2023-06-22 NOTE — DISCHARGE NOTE PROVIDER - NSDCCPGOAL_GEN_ALL_CORE_FT
The patient is a 61y Female complaining of arm pain/injury.
To get better and follow your care plan as instructed.

## 2023-06-22 NOTE — PROGRESS NOTE ADULT - ASSESSMENT
89 yo M w/ history of Dementia, [place], Prostate Ca w/ urinary retention and  chronic Harvey  presented w/ generalized weakness and was admitted w/ MRSA and Enterococcus faecalis UTI.    MRSA and Enterococcus faecalis UTI w/ associated with Chronic Harvey due to Prostate Ca   - no more fever  - NGTD on blood cxs  - IV vancomycin changed to Augmentin & Bactrim by ID      Dementia  - supportive care    Anemia of chronic disease  - H&H stable    Hx of prostate cancer and urinary retention  - c/w Harvey  - as per pharmacy, Lupron injection is not available in the hospital      Polyarthritis pain  - c/w ibuprofen and low dose prednisone    Constipation  - c/w Miralax    PreDM  - Hgb A1C is 5.9 - wife informed    Prophylaxis:  DVT: Lovenox  GI: Protonix    Pt is medically stable for discharge to [facility] pending bed availability.

## 2023-06-22 NOTE — PROGRESS NOTE ADULT - PROVIDER SPECIALTY LIST ADULT
Hospitalist
Infectious Disease
Infectious Disease
Hospitalist
Hospitalist

## 2023-06-23 VITALS
OXYGEN SATURATION: 96 % | SYSTOLIC BLOOD PRESSURE: 159 MMHG | DIASTOLIC BLOOD PRESSURE: 65 MMHG | HEART RATE: 64 BPM | TEMPERATURE: 98 F | RESPIRATION RATE: 18 BRPM

## 2023-06-23 LAB
CULTURE RESULTS: SIGNIFICANT CHANGE UP
CULTURE RESULTS: SIGNIFICANT CHANGE UP
SPECIMEN SOURCE: SIGNIFICANT CHANGE UP
SPECIMEN SOURCE: SIGNIFICANT CHANGE UP

## 2023-06-23 PROCEDURE — 99239 HOSP IP/OBS DSCHRG MGMT >30: CPT

## 2023-06-23 RX ADMIN — POLYETHYLENE GLYCOL 3350 17 GRAM(S): 17 POWDER, FOR SOLUTION ORAL at 11:13

## 2023-06-23 RX ADMIN — Medication 1 TABLET(S): at 11:13

## 2023-06-23 RX ADMIN — Medication 500 MILLIGRAM(S): at 11:14

## 2023-06-23 RX ADMIN — Medication 400 MILLIGRAM(S): at 07:20

## 2023-06-23 RX ADMIN — Medication 1 MILLIGRAM(S): at 11:13

## 2023-06-23 RX ADMIN — PANTOPRAZOLE SODIUM 40 MILLIGRAM(S): 20 TABLET, DELAYED RELEASE ORAL at 06:20

## 2023-06-23 RX ADMIN — Medication 400 MILLIGRAM(S): at 06:20

## 2023-06-23 RX ADMIN — Medication 500 MILLIGRAM(S): at 06:20

## 2023-06-23 RX ADMIN — Medication 1 TABLET(S): at 06:20

## 2023-06-23 RX ADMIN — Medication 20 MILLIGRAM(S): at 06:20

## 2023-06-28 DIAGNOSIS — R73.03 PREDIABETES: ICD-10-CM
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
## 2023-06-28 DIAGNOSIS — M13.0 POLYARTHRITIS, UNSPECIFIED: ICD-10-CM

## 2023-06-28 DIAGNOSIS — N30.80 OTHER CYSTITIS WITHOUT HEMATURIA: ICD-10-CM

## 2023-06-28 DIAGNOSIS — T83.511A INFECTION AND INFLAMMATORY REACTION DUE TO INDWELLING URETHRAL CATHETER, INITIAL ENCOUNTER: ICD-10-CM

## 2023-06-28 DIAGNOSIS — E87.5 HYPERKALEMIA: ICD-10-CM

## 2023-06-28 DIAGNOSIS — B95.2 ENTEROCOCCUS AS THE CAUSE OF DISEASES CLASSIFIED ELSEWHERE: ICD-10-CM
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
## 2023-06-28 DIAGNOSIS — D63.8 ANEMIA IN OTHER CHRONIC DISEASES CLASSIFIED ELSEWHERE: ICD-10-CM

## 2023-06-28 DIAGNOSIS — C61 MALIGNANT NEOPLASM OF PROSTATE: ICD-10-CM

## 2023-06-28 DIAGNOSIS — F03.90 UNSPECIFIED DEMENTIA WITHOUT BEHAVIORAL DISTURBANCE: ICD-10-CM

## 2023-06-28 DIAGNOSIS — Z90.49 ACQUIRED ABSENCE OF OTHER SPECIFIED PARTS OF DIGESTIVE TRACT: ICD-10-CM

## 2023-06-28 DIAGNOSIS — B95.62 METHICILLIN RESISTANT STAPHYLOCOCCUS AUREUS INFECTION AS THE CAUSE OF DISEASES CLASSIFIED ELSEWHERE: ICD-10-CM

## 2023-09-29 NOTE — ED ADULT NURSE NOTE - RN DISCHARGE SIGNATURE
Logan Quintero was seen and treated in our emergency department on 9/29/2023. Diagnosis:     [place]  . He may return on this date: If you have any questions or concerns, please don't hesitate to call.       Andrae Hankins PA-C    ______________________________           _______________          _______________  Hospital Representative                              Date                                Time
15-Jul-2018

## 2024-09-11 NOTE — PROGRESS NOTE ADULT - SUBJECTIVE AND OBJECTIVE BOX
83yMale s/p L STEPHANIE POD#1. Pt seen and examined in NAD. Pain controlled. Pt denies any new complaints. Pt denies CP/SOB/N/V/D/numbness/tingling/bowel or bladder dysfunction.     PE:  LLE: dressing c/d/i. +ROM ankle/toes. Calf: soft, compressible and nontender. DP/PT 2+ NVI.                           12.8   12.52 )-----------( 220      ( 27 Jul 2018 06:39 )             37.7       07-27    139  |  104  |  23  ----------------------------<  135<H>  3.9   |  28  |  1.06    Ca    8.2<L>      27 Jul 2018 06:40          A/P: 83yMale s/p L STEPHANIE POD#1.  Pain controlled  Total hip precautions  PT: WBAT   DVT ppx: SCDs/ASA 325mg BID    Wound care, Isometric exercises, incentive spirometry   Discharge: planning for home   All the above discussed and understood by pt no
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
